# Patient Record
Sex: FEMALE | Race: WHITE | NOT HISPANIC OR LATINO | Employment: OTHER | URBAN - METROPOLITAN AREA
[De-identification: names, ages, dates, MRNs, and addresses within clinical notes are randomized per-mention and may not be internally consistent; named-entity substitution may affect disease eponyms.]

---

## 2018-05-31 ENCOUNTER — OFFICE VISIT (OUTPATIENT)
Dept: INTERNAL MEDICINE | Facility: CLINIC | Age: 73
End: 2018-05-31

## 2018-05-31 VITALS
BODY MASS INDEX: 38.09 KG/M2 | WEIGHT: 207 LBS | HEIGHT: 62 IN | DIASTOLIC BLOOD PRESSURE: 82 MMHG | SYSTOLIC BLOOD PRESSURE: 124 MMHG

## 2018-05-31 DIAGNOSIS — G25.81 RLS (RESTLESS LEGS SYNDROME): Chronic | ICD-10-CM

## 2018-05-31 DIAGNOSIS — E03.9 ACQUIRED HYPOTHYROIDISM: Chronic | ICD-10-CM

## 2018-05-31 DIAGNOSIS — E78.01 FAMILIAL HYPERCHOLESTEROLEMIA: Chronic | ICD-10-CM

## 2018-05-31 DIAGNOSIS — F32.5 MAJOR DEPRESSIVE DISORDER WITH SINGLE EPISODE, IN FULL REMISSION (HCC): Primary | Chronic | ICD-10-CM

## 2018-05-31 DIAGNOSIS — I10 ESSENTIAL HYPERTENSION: Chronic | ICD-10-CM

## 2018-05-31 DIAGNOSIS — R11.0 NAUSEA: Chronic | ICD-10-CM

## 2018-05-31 PROCEDURE — 99204 OFFICE O/P NEW MOD 45 MIN: CPT | Performed by: INTERNAL MEDICINE

## 2018-05-31 RX ORDER — FERROUS SULFATE 325(65) MG
325 TABLET ORAL DAILY
COMMUNITY
End: 2020-04-29 | Stop reason: SDUPTHER

## 2018-05-31 RX ORDER — PRAMIPEXOLE DIHYDROCHLORIDE 0.12 MG/1
0.12 TABLET ORAL DAILY
COMMUNITY
Start: 2018-05-08 | End: 2018-07-26 | Stop reason: DRUGHIGH

## 2018-05-31 RX ORDER — TELMISARTAN 80 MG/1
80 TABLET ORAL DAILY
COMMUNITY
Start: 2018-04-04 | End: 2018-08-23 | Stop reason: SDUPTHER

## 2018-05-31 RX ORDER — LEVOTHYROXINE SODIUM 0.15 MG/1
150 TABLET ORAL DAILY
COMMUNITY
Start: 2018-05-07 | End: 2018-08-21 | Stop reason: SDUPTHER

## 2018-05-31 RX ORDER — PRAMIPEXOLE DIHYDROCHLORIDE 0.25 MG/1
TABLET ORAL
COMMUNITY
Start: 2018-05-08 | End: 2018-05-31 | Stop reason: DRUGHIGH

## 2018-05-31 RX ORDER — ONDANSETRON 4 MG/1
4 TABLET, FILM COATED ORAL EVERY 8 HOURS PRN
COMMUNITY
End: 2020-01-09 | Stop reason: SDUPTHER

## 2018-05-31 RX ORDER — PRAMIPEXOLE DIHYDROCHLORIDE 0.25 MG/1
0.25 TABLET ORAL DAILY
COMMUNITY
End: 2018-07-26 | Stop reason: DRUGHIGH

## 2018-05-31 RX ORDER — ESCITALOPRAM OXALATE 20 MG/1
20 TABLET ORAL DAILY
COMMUNITY
Start: 2018-05-07 | End: 2018-09-07 | Stop reason: SDUPTHER

## 2018-05-31 RX ORDER — GABAPENTIN 800 MG/1
800 TABLET ORAL DAILY
COMMUNITY
Start: 2018-05-14 | End: 2018-08-23 | Stop reason: SDUPTHER

## 2018-05-31 RX ORDER — TRAMADOL HYDROCHLORIDE 50 MG/1
50 TABLET ORAL DAILY
COMMUNITY
Start: 2018-05-14 | End: 2018-08-23 | Stop reason: SDUPTHER

## 2018-05-31 RX ORDER — ROSUVASTATIN CALCIUM 5 MG/1
5 TABLET, COATED ORAL DAILY
COMMUNITY
Start: 2018-04-03 | End: 2018-08-23 | Stop reason: SDUPTHER

## 2018-05-31 NOTE — PROGRESS NOTES
Subjective   Prudence Zollinger is a 72 y.o. female.     History of Present Illness     The following portions of the patient's history were reviewed and updated as appropriate: allergies, current medications, past family history, past medical history, past social history, past surgical history and problem list.  73 yo/f with hypothyroidism, depression, RLS, Hyperlipidemia, HTN, chronic abdominal pain, nausea and vomiting, mixed constipation and diarrhea here for an initial evaluation. She has not had a GI work up thus far at her previous practice.   Review of Systems   Constitutional: Positive for fatigue.   HENT: Negative.    Eyes: Negative.    Respiratory: Negative.    Cardiovascular: Negative.    Gastrointestinal: Positive for abdominal distention, abdominal pain, constipation, diarrhea and nausea.   Endocrine: Negative.    Genitourinary: Negative.    Musculoskeletal: Positive for arthralgias and back pain.   Skin: Negative.    Allergic/Immunologic: Negative.    Neurological: Negative.    Hematological: Negative.    Psychiatric/Behavioral: Negative.        Objective   Physical Exam   Constitutional: She is oriented to person, place, and time. She appears well-developed and well-nourished.   HENT:   Head: Normocephalic and atraumatic.   Eyes: EOM are normal. Pupils are equal, round, and reactive to light.   Neck: Normal range of motion.   Cardiovascular: Normal rate, regular rhythm and normal heart sounds.    Pulmonary/Chest: Effort normal and breath sounds normal.   Abdominal: Soft. She exhibits distension. There is tenderness.   Musculoskeletal:   Low back pain   Neurological: She is alert and oriented to person, place, and time.   Skin: Skin is warm and dry.   Psychiatric: She has a normal mood and affect. Her behavior is normal. Judgment and thought content normal.   Nursing note and vitals reviewed.        Assessment/Plan   Prudence was seen today for hypertension and hypothyroidism.    Diagnoses and all  orders for this visit:    Major depressive disorder with single episode, in full remission  Comments:  doing relatively    Acquired hypothyroidism  Comments:  will check TFT's  Orders:  -     TSH; Future    RLS (restless legs syndrome)  Comments:  taking Mirapex    Familial hypercholesterolemia  Comments:  will check lipids  Orders:  -     Lipid Panel; Future    Essential hypertension  Comments:  well controlled  Orders:  -     Comprehensive Metabolic Panel; Future  -     CBC & Differential; Future    Nausea  Comments:  UGI and GI follow up  Orders:  -     FL Upper GI With Air Contrast; Future  -     Ambulatory Referral to Gastroenterology

## 2018-06-01 ENCOUNTER — LAB (OUTPATIENT)
Dept: INTERNAL MEDICINE | Facility: CLINIC | Age: 73
End: 2018-06-01

## 2018-06-01 DIAGNOSIS — I10 ESSENTIAL HYPERTENSION: ICD-10-CM

## 2018-06-01 DIAGNOSIS — E03.9 ACQUIRED HYPOTHYROIDISM: Chronic | ICD-10-CM

## 2018-06-01 DIAGNOSIS — E78.01 FAMILIAL HYPERCHOLESTEROLEMIA: Chronic | ICD-10-CM

## 2018-06-01 LAB
ALBUMIN SERPL-MCNC: 4.1 G/DL (ref 3.5–5.2)
ALBUMIN/GLOB SERPL: 1.4 G/DL
ALP SERPL-CCNC: 120 U/L (ref 39–117)
ALT SERPL-CCNC: 18 U/L (ref 1–33)
AST SERPL-CCNC: 18 U/L (ref 1–32)
BASOPHILS # BLD AUTO: 0.01 10*3/MM3 (ref 0–0.2)
BASOPHILS NFR BLD AUTO: 0.1 % (ref 0–2)
BILIRUB SERPL-MCNC: 0.3 MG/DL (ref 0.1–1.2)
BUN SERPL-MCNC: 22 MG/DL (ref 8–23)
BUN/CREAT SERPL: 21 (ref 7–25)
CALCIUM SERPL-MCNC: 9.6 MG/DL (ref 8.6–10.5)
CHLORIDE SERPL-SCNC: 101 MMOL/L (ref 98–107)
CHOLEST SERPL-MCNC: 118 MG/DL (ref 0–200)
CO2 SERPL-SCNC: 23.4 MMOL/L (ref 22–29)
CREAT SERPL-MCNC: 1.05 MG/DL (ref 0.57–1)
DEPRECATED RDW RBC AUTO: 44.9 FL (ref 37–54)
EOSINOPHIL # BLD AUTO: 0.15 10*3/MM3 (ref 0–0.7)
EOSINOPHIL NFR BLD AUTO: 1.6 % (ref 0–5)
ERYTHROCYTE [DISTWIDTH] IN BLOOD BY AUTOMATED COUNT: 13.7 % (ref 11.5–15)
GLOBULIN SER CALC-MCNC: 3 GM/DL
GLUCOSE SERPL-MCNC: 85 MG/DL (ref 65–99)
HCT VFR BLD AUTO: 36.6 % (ref 34.1–44.9)
HDLC SERPL-MCNC: 46 MG/DL (ref 40–60)
HGB BLD-MCNC: 11.7 G/DL (ref 11.2–15.7)
LDLC SERPL CALC-MCNC: 36 MG/DL (ref 0–100)
LYMPHOCYTES # BLD AUTO: 2.05 10*3/MM3 (ref 0.8–7)
LYMPHOCYTES NFR BLD AUTO: 22.4 % (ref 10–60)
MCH RBC QN AUTO: 29.2 PG (ref 26–34)
MCHC RBC AUTO-ENTMCNC: 32 G/DL (ref 31–37)
MCV RBC AUTO: 91.3 FL (ref 80–100)
MONOCYTES # BLD AUTO: 0.59 10*3/MM3 (ref 0–1)
MONOCYTES NFR BLD AUTO: 6.4 % (ref 0–13)
NEUTROPHILS # BLD AUTO: 6.35 10*3/MM3 (ref 1–11)
NEUTROPHILS NFR BLD AUTO: 69.5 % (ref 30–85)
PLATELET # BLD AUTO: 255 10*3/MM3 (ref 150–450)
PMV BLD AUTO: 10.2 FL (ref 6–12)
POTASSIUM SERPL-SCNC: 4.5 MMOL/L (ref 3.5–5.2)
PROT SERPL-MCNC: 7.1 G/DL (ref 6–8.5)
RBC # BLD AUTO: 4.01 10*6/MM3 (ref 3.93–5.22)
SODIUM SERPL-SCNC: 141 MMOL/L (ref 136–145)
TRIGL SERPL-MCNC: 182 MG/DL (ref 0–150)
TSH SERPL-ACNC: 0.16 MIU/ML (ref 0.27–4.2)
VLDLC SERPL-MCNC: 36.4 MG/DL (ref 5–40)
WBC NRBC COR # BLD: 9.15 10*3/MM3 (ref 5–10)

## 2018-06-01 PROCEDURE — 85025 COMPLETE CBC W/AUTO DIFF WBC: CPT | Performed by: INTERNAL MEDICINE

## 2018-06-01 PROCEDURE — 36415 COLL VENOUS BLD VENIPUNCTURE: CPT | Performed by: INTERNAL MEDICINE

## 2018-06-27 ENCOUNTER — HOSPITAL ENCOUNTER (OUTPATIENT)
Dept: GENERAL RADIOLOGY | Facility: HOSPITAL | Age: 73
Discharge: HOME | End: 2018-06-27
Admitting: INTERNAL MEDICINE

## 2018-06-27 DIAGNOSIS — R11.0 NAUSEA: Chronic | ICD-10-CM

## 2018-06-27 PROCEDURE — A9270 NON-COVERED ITEM OR SERVICE: HCPCS | Performed by: RADIOLOGY

## 2018-06-27 PROCEDURE — 63710000001 BARIUM SULFATE 96 % RECONSTITUTED SUSPENSION: Performed by: RADIOLOGY

## 2018-06-27 PROCEDURE — 74247: CPT

## 2018-06-27 PROCEDURE — 63710000001 SOD BICARB-CITRIC ACID-SIMETHICONE 2.21-1.53-0.04 G PACK: Performed by: RADIOLOGY

## 2018-06-27 PROCEDURE — 63710000001 BARIUM SULFATE 98 % RECONSTITUTED SUSPENSION: Performed by: RADIOLOGY

## 2018-06-27 RX ADMIN — ANTACID/ANTIFLATULENT 1 TABLET: 380; 550; 10; 10 GRANULE, EFFERVESCENT ORAL at 08:24

## 2018-06-27 RX ADMIN — BARIUM SULFATE 135 ML: 980 POWDER, FOR SUSPENSION ORAL at 08:23

## 2018-06-27 RX ADMIN — BARIUM SULFATE 183 ML: 960 POWDER, FOR SUSPENSION ORAL at 08:23

## 2018-06-28 ENCOUNTER — OFFICE VISIT (OUTPATIENT)
Dept: INTERNAL MEDICINE | Facility: CLINIC | Age: 73
End: 2018-06-28

## 2018-06-28 VITALS
SYSTOLIC BLOOD PRESSURE: 128 MMHG | DIASTOLIC BLOOD PRESSURE: 82 MMHG | HEIGHT: 62 IN | BODY MASS INDEX: 37.91 KG/M2 | WEIGHT: 206 LBS

## 2018-06-28 DIAGNOSIS — F32.5 MAJOR DEPRESSIVE DISORDER WITH SINGLE EPISODE, IN FULL REMISSION (HCC): Chronic | ICD-10-CM

## 2018-06-28 DIAGNOSIS — E78.01 FAMILIAL HYPERCHOLESTEROLEMIA: Chronic | ICD-10-CM

## 2018-06-28 DIAGNOSIS — E03.9 ACQUIRED HYPOTHYROIDISM: Chronic | ICD-10-CM

## 2018-06-28 DIAGNOSIS — G25.81 RLS (RESTLESS LEGS SYNDROME): Chronic | ICD-10-CM

## 2018-06-28 DIAGNOSIS — I10 ESSENTIAL HYPERTENSION: Primary | Chronic | ICD-10-CM

## 2018-06-28 PROCEDURE — 99214 OFFICE O/P EST MOD 30 MIN: CPT | Performed by: INTERNAL MEDICINE

## 2018-06-28 NOTE — PROGRESS NOTES
Subjective   Prudence Zollinger is a 72 y.o. female.     History of Present Illness     The following portions of the patient's history were reviewed and updated as appropriate: allergies, current medications, past family history, past medical history, past social history, past surgical history and problem list.  71 yo/f with a chief complaint of HTN and hyperlipidemia (both well controlled), hypothyroidism (well controlled), RLS (well controlled at present), major depressive disorder (very well controlled), here for follow up.  Review of Systems   Constitutional: Negative.    HENT: Negative.    Eyes: Negative.    Respiratory: Negative.    Cardiovascular: Negative.    Gastrointestinal: Negative.    Endocrine: Negative.    Genitourinary: Negative.    Musculoskeletal: Positive for arthralgias.   Skin: Negative.    Allergic/Immunologic: Negative.    Neurological: Negative.    Hematological: Negative.    Psychiatric/Behavioral: Negative.        Objective   Physical Exam   Constitutional: She is oriented to person, place, and time. She appears well-developed and well-nourished.   HENT:   Head: Normocephalic and atraumatic.   Eyes: EOM are normal. Pupils are equal, round, and reactive to light.   Neck: Normal range of motion. Neck supple.   Cardiovascular: Normal rate, regular rhythm and normal heart sounds.    Pulmonary/Chest: Effort normal and breath sounds normal.   Abdominal: Soft. Bowel sounds are normal.   Musculoskeletal: Normal range of motion.   Neurological: She is alert and oriented to person, place, and time.   Skin: Skin is warm and dry.   Psychiatric: She has a normal mood and affect. Her behavior is normal. Judgment and thought content normal.   Nursing note and vitals reviewed.        Assessment/Plan   Prudence was seen today for follow-up.    Diagnoses and all orders for this visit:    Essential hypertension  Comments:  well controlled    Familial hypercholesterolemia  Comments:  well controlled no  changes needed    Acquired hypothyroidism  Comments:  routinely check TSH    RLS (restless legs syndrome)  Comments:  well controlled with her current medications    Major depressive disorder with single episode, in full remission  Comments:  well controlled overall

## 2018-07-09 ENCOUNTER — CONVERSION ENCOUNTER (OUTPATIENT)
Dept: GENERAL RADIOLOGY | Facility: HOSPITAL | Age: 73
End: 2018-07-09

## 2018-07-26 ENCOUNTER — OFFICE VISIT (OUTPATIENT)
Dept: INTERNAL MEDICINE | Facility: CLINIC | Age: 73
End: 2018-07-26

## 2018-07-26 VITALS
OXYGEN SATURATION: 98 % | WEIGHT: 206 LBS | HEART RATE: 66 BPM | TEMPERATURE: 98.4 F | SYSTOLIC BLOOD PRESSURE: 152 MMHG | DIASTOLIC BLOOD PRESSURE: 92 MMHG | BODY MASS INDEX: 37.98 KG/M2

## 2018-07-26 DIAGNOSIS — I10 ESSENTIAL HYPERTENSION: ICD-10-CM

## 2018-07-26 DIAGNOSIS — K06.8 BLEEDING GUMS: Primary | ICD-10-CM

## 2018-07-26 DIAGNOSIS — E03.9 ACQUIRED HYPOTHYROIDISM: ICD-10-CM

## 2018-07-26 LAB — TSH SERPL-ACNC: 0.41 MIU/ML (ref 0.27–4.2)

## 2018-07-26 PROCEDURE — 99213 OFFICE O/P EST LOW 20 MIN: CPT | Performed by: NURSE PRACTITIONER

## 2018-07-26 RX ORDER — PRAMIPEXOLE DIHYDROCHLORIDE 0.12 MG/1
0.12 TABLET ORAL DAILY
COMMUNITY
End: 2018-10-03 | Stop reason: SDUPTHER

## 2018-07-26 RX ORDER — PRAMIPEXOLE DIHYDROCHLORIDE 0.5 MG/1
1 TABLET ORAL DAILY
COMMUNITY
Start: 2018-07-02 | End: 2018-10-04 | Stop reason: SDUPTHER

## 2018-07-26 RX ORDER — OMEPRAZOLE 40 MG/1
1 CAPSULE, DELAYED RELEASE ORAL DAILY
COMMUNITY
Start: 2018-06-05 | End: 2020-04-29 | Stop reason: SDUPTHER

## 2018-07-26 NOTE — PROGRESS NOTES
Lorenzo Holloway Zollinger is a 72 y.o. female.     She was at [facility] for routine appointment yesterday and her blood pressure was elevated. She was informed that her gums were inflamed.  No treated was initiated. She reports no increased salt or caffiene intake.        Dental Pain    This is a new problem. The pain is at a severity of 0/10. The patient is experiencing no pain. Associated symptoms include facial pain (intermittent, chronic ) and oral bleeding. Pertinent negatives include no difficulty swallowing, fever, sinus pressure or thermal sensitivity. She has tried nothing for the symptoms.   Hypertension   This is a chronic problem. The current episode started more than 1 year ago. The problem is uncontrolled. Associated symptoms include palpitations (? yesterday in morning, intermittent ). Pertinent negatives include no anxiety, blurred vision, chest pain, headaches, orthopnea, peripheral edema, PND or shortness of breath. Current antihypertension treatment includes angiotensin blockers and calcium channel blockers. The current treatment provides moderate improvement.        The following portions of the patient's history were reviewed and updated as appropriate: allergies, current medications, past family history, past medical history, past social history, past surgical history and problem list.    Review of Systems   Constitutional: Negative for fever.   HENT: Negative for postnasal drip and sinus pressure.    Eyes: Negative for blurred vision.   Respiratory: Negative for cough and shortness of breath.    Cardiovascular: Positive for palpitations (? yesterday in morning, intermittent ) and leg swelling. Negative for chest pain, orthopnea and PND.   Neurological: Negative for dizziness and headaches.       Objective   Physical Exam   Constitutional: She is oriented to person, place, and time. She appears well-developed and well-nourished.   HENT:   Head: Normocephalic.   Nose: Nose normal.    Cardiovascular: Regular rhythm and normal heart sounds.  Exam reveals no S3 and no S4.    No murmur heard.  Repeat bp left arm 162/90  No pedal edema    Pulmonary/Chest: Effort normal and breath sounds normal. She has no decreased breath sounds. She has no wheezes. She has no rhonchi. She has no rales.   Musculoskeletal: She exhibits no edema.   Neurological: She is alert and oriented to person, place, and time. Gait normal.   Skin: Skin is warm and dry.   Psychiatric: She has a normal mood and affect.       Assessment/Plan   Prudence was seen today for dental pain and hypertension.    Diagnoses and all orders for this visit:    Bleeding gums  Comments:  continue with peridonitis     Essential hypertension  Comments:  goal of less than 140/90; will increase verapamil 1.5 tablet     Acquired hypothyroidism  Comments:  will check labs most recent TSH low   Orders:  -     TSH Rfx On Abnormal To Free T4; Future  -     TSH Rfx On Abnormal To Free T4    She will call in one week with bp readings.

## 2018-07-26 NOTE — PATIENT INSTRUCTIONS
"DASH Eating Plan  DASH stands for \"Dietary Approaches to Stop Hypertension.\" The DASH eating plan is a healthy eating plan that has been shown to reduce high blood pressure (hypertension). It may also reduce your risk for type 2 diabetes, heart disease, and stroke. The DASH eating plan may also help with weight loss.  What are tips for following this plan?  General guidelines  · Avoid eating more than 2,300 mg (milligrams) of salt (sodium) a day. If you have hypertension, you may need to reduce your sodium intake to 1,500 mg a day.  · Limit alcohol intake to no more than 1 drink a day for nonpregnant women and 2 drinks a day for men. One drink equals 12 oz of beer, 5 oz of wine, or 1½ oz of hard liquor.  · Work with your health care provider to maintain a healthy body weight or to lose weight. Ask what an ideal weight is for you.  · Get at least 30 minutes of exercise that causes your heart to beat faster (aerobic exercise) most days of the week. Activities may include walking, swimming, or biking.  · Work with your health care provider or diet and nutrition specialist (dietitian) to adjust your eating plan to your individual calorie needs.  Reading food labels  · Check food labels for the amount of sodium per serving. Choose foods with less than 5 percent of the Daily Value of sodium. Generally, foods with less than 300 mg of sodium per serving fit into this eating plan.  · To find whole grains, look for the word \"whole\" as the first word in the ingredient list.  Shopping  · Buy products labeled as \"low-sodium\" or \"no salt added.\"  · Buy fresh foods. Avoid canned foods and premade or frozen meals.  Cooking  · Avoid adding salt when cooking. Use salt-free seasonings or herbs instead of table salt or sea salt. Check with your health care provider or pharmacist before using salt substitutes.  · Do not echols foods. Cook foods using healthy methods such as baking, boiling, grilling, and broiling instead.  · Cook with " heart-healthy oils, such as olive, canola, soybean, or sunflower oil.  Meal planning    · Eat a balanced diet that includes:  ? 5 or more servings of fruits and vegetables each day. At each meal, try to fill half of your plate with fruits and vegetables.  ? Up to 6-8 servings of whole grains each day.  ? Less than 6 oz of lean meat, poultry, or fish each day. A 3-oz serving of meat is about the same size as a deck of cards. One egg equals 1 oz.  ? 2 servings of low-fat dairy each day.  ? A serving of nuts, seeds, or beans 5 times each week.  ? Heart-healthy fats. Healthy fats called Omega-3 fatty acids are found in foods such as flaxseeds and coldwater fish, like sardines, salmon, and mackerel.  · Limit how much you eat of the following:  ? Canned or prepackaged foods.  ? Food that is high in trans fat, such as fried foods.  ? Food that is high in saturated fat, such as fatty meat.  ? Sweets, desserts, sugary drinks, and other foods with added sugar.  ? Full-fat dairy products.  · Do not salt foods before eating.  · Try to eat at least 2 vegetarian meals each week.  · Eat more home-cooked food and less restaurant, buffet, and fast food.  · When eating at a restaurant, ask that your food be prepared with less salt or no salt, if possible.  What foods are recommended?  The items listed may not be a complete list. Talk with your dietitian about what dietary choices are best for you.  Grains  Whole-grain or whole-wheat bread. Whole-grain or whole-wheat pasta. Brown rice. Oatmeal. Quinoa. Bulgur. Whole-grain and low-sodium cereals. Shraddha bread. Low-fat, low-sodium crackers. Whole-wheat flour tortillas.  Vegetables  Fresh or frozen vegetables (raw, steamed, roasted, or grilled). Low-sodium or reduced-sodium tomato and vegetable juice. Low-sodium or reduced-sodium tomato sauce and tomato paste. Low-sodium or reduced-sodium canned vegetables.  Fruits  All fresh, dried, or frozen fruit. Canned fruit in natural juice (without  added sugar).  Meat and other protein foods  Skinless chicken or turkey. Ground chicken or turkey. Pork with fat trimmed off. Fish and seafood. Egg whites. Dried beans, peas, or lentils. Unsalted nuts, nut butters, and seeds. Unsalted canned beans. Lean cuts of beef with fat trimmed off. Low-sodium, lean deli meat.  Dairy  Low-fat (1%) or fat-free (skim) milk. Fat-free, low-fat, or reduced-fat cheeses. Nonfat, low-sodium ricotta or cottage cheese. Low-fat or nonfat yogurt. Low-fat, low-sodium cheese.  Fats and oils  Soft margarine without trans fats. Vegetable oil. Low-fat, reduced-fat, or light mayonnaise and salad dressings (reduced-sodium). Canola, safflower, olive, soybean, and sunflower oils. Avocado.  Seasoning and other foods  Herbs. Spices. Seasoning mixes without salt. Unsalted popcorn and pretzels. Fat-free sweets.  What foods are not recommended?  The items listed may not be a complete list. Talk with your dietitian about what dietary choices are best for you.  Grains  Baked goods made with fat, such as croissants, muffins, or some breads. Dry pasta or rice meal packs.  Vegetables  Creamed or fried vegetables. Vegetables in a cheese sauce. Regular canned vegetables (not low-sodium or reduced-sodium). Regular canned tomato sauce and paste (not low-sodium or reduced-sodium). Regular tomato and vegetable juice (not low-sodium or reduced-sodium). Pickles. Olives.  Fruits  Canned fruit in a light or heavy syrup. Fried fruit. Fruit in cream or butter sauce.  Meat and other protein foods  Fatty cuts of meat. Ribs. Fried meat. Nicolas. Sausage. Bologna and other processed lunch meats. Salami. Fatback. Hotdogs. Bratwurst. Salted nuts and seeds. Canned beans with added salt. Canned or smoked fish. Whole eggs or egg yolks. Chicken or turkey with skin.  Dairy  Whole or 2% milk, cream, and half-and-half. Whole or full-fat cream cheese. Whole-fat or sweetened yogurt. Full-fat cheese. Nondairy creamers. Whipped toppings.  Processed cheese and cheese spreads.  Fats and oils  Butter. Stick margarine. Lard. Shortening. Ghee. Nicolas fat. Tropical oils, such as coconut, palm kernel, or palm oil.  Seasoning and other foods  Salted popcorn and pretzels. Onion salt, garlic salt, seasoned salt, table salt, and sea salt. Worcestershire sauce. Tartar sauce. Barbecue sauce. Teriyaki sauce. Soy sauce, including reduced-sodium. Steak sauce. Canned and packaged gravies. Fish sauce. Oyster sauce. Cocktail sauce. Horseradish that you find on the shelf. Ketchup. Mustard. Meat flavorings and tenderizers. Bouillon cubes. Hot sauce and Tabasco sauce. Premade or packaged marinades. Premade or packaged taco seasonings. Relishes. Regular salad dressings.  Where to find more information:  · National Heart, Lung, and Blood [place]: www.nhlbi.nih.gov  · American Heart Association: www.heart.org  Summary  · The DASH eating plan is a healthy eating plan that has been shown to reduce high blood pressure (hypertension). It may also reduce your risk for type 2 diabetes, heart disease, and stroke.  · With the DASH eating plan, you should limit salt (sodium) intake to 2,300 mg a day. If you have hypertension, you may need to reduce your sodium intake to 1,500 mg a day.  · When on the DASH eating plan, aim to eat more fresh fruits and vegetables, whole grains, lean proteins, low-fat dairy, and heart-healthy fats.  · Work with your health care provider or diet and nutrition specialist (dietitian) to adjust your eating plan to your individual calorie needs.  This information is not intended to replace advice given to you by your health care provider. Make sure you discuss any questions you have with your health care provider.  Document Released: 12/06/2012 Document Revised: 12/11/2017 Document Reviewed: 12/11/2017  [employer] Interactive Patient Education © 2018 [employer]

## 2018-07-30 NOTE — TELEPHONE ENCOUNTER
----- Message from Cami Howe sent at 7/30/2018 10:52 AM EDT -----  Contact: Patient   Patient called needing refill on     verapamil SR (CALAN-SR) 180 MG CR tablet    She saw Jessica last week who increased her medicine and now is out.  This morning at 10 AM her /85; pulse 65.  Please advise.      Patient:  [phone]    Pharmacy:  [facility] [address] [place] [address] [address] [facility] [phone] [facility] [phone] FX

## 2018-07-30 NOTE — TELEPHONE ENCOUNTER
I attempted to call patient to discuss her blood pressure readings. There was no answer so I left a message for patient to return call.

## 2018-07-31 ENCOUNTER — TELEPHONE (OUTPATIENT)
Dept: INTERNAL MEDICINE | Facility: CLINIC | Age: 73
End: 2018-07-31

## 2018-07-31 DIAGNOSIS — I10 ESSENTIAL HYPERTENSION: Primary | ICD-10-CM

## 2018-07-31 RX ORDER — VERAPAMIL HYDROCHLORIDE 300 MG/1
300 CAPSULE, EXTENDED RELEASE ORAL NIGHTLY
Qty: 30 CAPSULE | Refills: 1 | OUTPATIENT
Start: 2018-07-31 | End: 2018-10-02 | Stop reason: SDUPTHER

## 2018-07-31 NOTE — TELEPHONE ENCOUNTER
I called patient and she states her blood pressure remains in 150/80's and hr 60-70's.. She denies any headache, dizziness or visual disturbances. Will change to verapamil 300 mg. She will continue to monitor and bring to upcoming appointment.

## 2018-08-21 ENCOUNTER — OFFICE VISIT (OUTPATIENT)
Dept: INTERNAL MEDICINE | Facility: CLINIC | Age: 73
End: 2018-08-21

## 2018-08-21 VITALS
WEIGHT: 204 LBS | HEIGHT: 62 IN | SYSTOLIC BLOOD PRESSURE: 136 MMHG | DIASTOLIC BLOOD PRESSURE: 86 MMHG | BODY MASS INDEX: 37.54 KG/M2

## 2018-08-21 DIAGNOSIS — E03.9 ACQUIRED HYPOTHYROIDISM: Chronic | ICD-10-CM

## 2018-08-21 DIAGNOSIS — I10 ESSENTIAL HYPERTENSION: Primary | Chronic | ICD-10-CM

## 2018-08-21 DIAGNOSIS — G25.81 RLS (RESTLESS LEGS SYNDROME): Chronic | ICD-10-CM

## 2018-08-21 DIAGNOSIS — F32.5 MAJOR DEPRESSIVE DISORDER WITH SINGLE EPISODE, IN FULL REMISSION (HCC): ICD-10-CM

## 2018-08-21 PROCEDURE — 99214 OFFICE O/P EST MOD 30 MIN: CPT | Performed by: INTERNAL MEDICINE

## 2018-08-21 RX ORDER — LEVOTHYROXINE SODIUM 0.15 MG/1
150 TABLET ORAL DAILY
Qty: 90 TABLET | Refills: 3 | Status: SHIPPED | OUTPATIENT
Start: 2018-08-21 | End: 2018-09-07 | Stop reason: SDUPTHER

## 2018-08-21 NOTE — PROGRESS NOTES
Subjective   Prudence Zollinger is a 72 y.o. female.     History of Present Illness     The following portions of the patient's history were reviewed and updated as appropriate: allergies, current medications, past family history, past medical history, past social history, past surgical history and problem list.  73 yo/f with a chief complaint of labile HTN (under better control), depression (well controlled), hypothyroidism (well controlled), RLS (well controlled), here for follow up. She is doing better overall.  Review of Systems   Constitutional: Positive for fatigue.   HENT: Negative.    Eyes: Negative.    Respiratory: Negative.    Cardiovascular: Negative.    Gastrointestinal: Negative.    Endocrine: Negative.    Genitourinary: Negative.    Musculoskeletal: Positive for arthralgias.   Skin: Negative.    Allergic/Immunologic: Negative.    Neurological: Negative.    Hematological: Negative.    Psychiatric/Behavioral: Negative.        Objective   Physical Exam   Constitutional: She is oriented to person, place, and time. She appears well-developed and well-nourished.   HENT:   Head: Normocephalic and atraumatic.   Eyes: Pupils are equal, round, and reactive to light. EOM are normal.   Neck: Normal range of motion. Neck supple.   Cardiovascular: Normal rate, regular rhythm and normal heart sounds.    Pulmonary/Chest: Effort normal and breath sounds normal.   Abdominal: Soft. Bowel sounds are normal.   Musculoskeletal: Normal range of motion.   Neurological: She is alert and oriented to person, place, and time.   Skin: Skin is warm and dry.   Psychiatric: She has a normal mood and affect. Her behavior is normal. Judgment and thought content normal.   Nursing note and vitals reviewed.        Assessment/Plan   Prudence was seen today for hypertension, hyperlipidemia and hypothyroidism.    Diagnoses and all orders for this visit:    Essential hypertension  Comments:  doing better overall    Acquired  hypothyroidism  Comments:  well controlled    RLS (restless legs syndrome)  Comments:  doing better overall    Major depressive disorder with single episode, in full remission (CMS/[facility])  Comments:  doing well on lexapro

## 2018-08-24 RX ORDER — GABAPENTIN 800 MG/1
800 TABLET ORAL DAILY
Qty: 90 TABLET | Refills: 3 | Status: SHIPPED | OUTPATIENT
Start: 2018-08-24 | End: 2019-03-15 | Stop reason: SDUPTHER

## 2018-08-24 RX ORDER — ROSUVASTATIN CALCIUM 5 MG/1
5 TABLET, COATED ORAL DAILY
Qty: 90 TABLET | Refills: 3 | Status: SHIPPED | OUTPATIENT
Start: 2018-08-24 | End: 2019-11-27 | Stop reason: SDUPTHER

## 2018-08-24 RX ORDER — TELMISARTAN 80 MG/1
80 TABLET ORAL DAILY
Qty: 90 TABLET | Refills: 3 | Status: SHIPPED | OUTPATIENT
Start: 2018-08-24 | End: 2019-11-04 | Stop reason: SDUPTHER

## 2018-08-24 RX ORDER — TRAMADOL HYDROCHLORIDE 50 MG/1
50 TABLET ORAL DAILY
Qty: 60 TABLET | Refills: 3 | Status: SHIPPED | OUTPATIENT
Start: 2018-08-24 | End: 2019-03-15 | Stop reason: SDUPTHER

## 2018-09-07 RX ORDER — LEVOTHYROXINE SODIUM 0.15 MG/1
150 TABLET ORAL DAILY
Qty: 30 TABLET | Refills: 5 | Status: SHIPPED | OUTPATIENT
Start: 2018-09-07 | End: 2019-10-24 | Stop reason: SDUPTHER

## 2018-09-07 RX ORDER — ESCITALOPRAM OXALATE 20 MG/1
20 TABLET ORAL DAILY
Qty: 30 TABLET | Refills: 5 | Status: SHIPPED | OUTPATIENT
Start: 2018-09-07 | End: 2019-06-09 | Stop reason: SDUPTHER

## 2018-10-02 DIAGNOSIS — I10 ESSENTIAL HYPERTENSION: ICD-10-CM

## 2018-10-02 RX ORDER — VERAPAMIL HYDROCHLORIDE 300 MG/1
CAPSULE, EXTENDED RELEASE ORAL
Qty: 30 CAPSULE | Refills: 5 | Status: SHIPPED | OUTPATIENT
Start: 2018-10-02 | End: 2019-04-01 | Stop reason: SDUPTHER

## 2018-10-03 RX ORDER — PRAMIPEXOLE DIHYDROCHLORIDE 0.12 MG/1
0.12 TABLET ORAL DAILY
Qty: 90 TABLET | Refills: 3 | Status: SHIPPED | OUTPATIENT
Start: 2018-10-03 | End: 2019-09-25 | Stop reason: SDUPTHER

## 2018-10-04 ENCOUNTER — TELEPHONE (OUTPATIENT)
Dept: INTERNAL MEDICINE | Facility: CLINIC | Age: 73
End: 2018-10-04

## 2018-10-04 ENCOUNTER — CLINICAL SUPPORT (OUTPATIENT)
Dept: INTERNAL MEDICINE | Facility: CLINIC | Age: 73
End: 2018-10-04

## 2018-10-04 DIAGNOSIS — Z23 NEED FOR VACCINATION: Primary | ICD-10-CM

## 2018-10-04 PROCEDURE — G0008 ADMIN INFLUENZA VIRUS VAC: HCPCS | Performed by: INTERNAL MEDICINE

## 2018-10-04 PROCEDURE — 90662 IIV NO PRSV INCREASED AG IM: CPT | Performed by: INTERNAL MEDICINE

## 2018-10-04 RX ORDER — PRAMIPEXOLE DIHYDROCHLORIDE 0.5 MG/1
0.5 TABLET ORAL DAILY
Qty: 90 TABLET | Refills: 3 | Status: SHIPPED | OUTPATIENT
Start: 2018-10-04 | End: 2019-12-27

## 2018-10-04 NOTE — TELEPHONE ENCOUNTER
----- Message from Funmi Allred sent at 10/4/2018 11:35 AM EDT -----  Contact: patient was in  Ms. Zollinger needs a refill on the following:      Mirapex 0.125 and 0.5    [facility] [address] AT [address] - [phone]  - [phone] (Phone)  [phone] (Fax)    Call back at [phone]    Thank you,    Funmi

## 2018-11-06 ENCOUNTER — OFFICE VISIT (OUTPATIENT)
Dept: INTERNAL MEDICINE | Facility: CLINIC | Age: 73
End: 2018-11-06

## 2018-11-06 VITALS
OXYGEN SATURATION: 98 % | HEART RATE: 80 BPM | WEIGHT: 207 LBS | BODY MASS INDEX: 38.09 KG/M2 | TEMPERATURE: 97.7 F | HEIGHT: 62 IN | DIASTOLIC BLOOD PRESSURE: 84 MMHG | SYSTOLIC BLOOD PRESSURE: 146 MMHG

## 2018-11-06 DIAGNOSIS — M25.561 ACUTE PAIN OF RIGHT KNEE: Primary | ICD-10-CM

## 2018-11-06 PROCEDURE — 99213 OFFICE O/P EST LOW 20 MIN: CPT | Performed by: NURSE PRACTITIONER

## 2018-11-06 RX ORDER — FUROSEMIDE 20 MG/1
20 TABLET ORAL DAILY
COMMUNITY
Start: 2018-10-24 | End: 2020-04-29 | Stop reason: SDUPTHER

## 2018-11-06 RX ORDER — METHYLPREDNISOLONE 4 MG/1
TABLET ORAL
Qty: 21 TABLET | Refills: 0 | Status: SHIPPED | OUTPATIENT
Start: 2018-11-06 | End: 2018-11-30

## 2018-11-06 NOTE — PROGRESS NOTES
Lorenzo Holloway Zollinger is a 73 y.o. female.     She joined silver sneakers and has been having knee pain since the end of September. She went to [facility] ER on 10/22/2018 and had doppler (negative for DVT) due to swelling of legs. Xray of knee was performed. She was given lasix which helped significantly.       Knee Pain    The incident occurred more than 1 week ago. The injury mechanism is unknown. The pain is present in the right knee. The quality of the pain is described as aching. The pain is at a severity of 8/10. The pain is moderate. The pain has been constant since onset. Pertinent negatives include no inability to bear weight, loss of motion, numbness or tingling. The symptoms are aggravated by weight bearing and movement. She has tried rest (ultram, voltaren, heat ) for the symptoms. The treatment provided moderate relief.        The following portions of the patient's history were reviewed and updated as appropriate: allergies, current medications, past family history, past medical history, past social history, past surgical history and problem list.    Review of Systems   Constitutional: Negative for activity change, appetite change, fatigue and fever.   Respiratory: Negative for cough, shortness of breath and wheezing.    Cardiovascular: Negative for chest pain, palpitations and leg swelling.   Musculoskeletal: Positive for arthralgias (right knee ), back pain (chronic low back ) and joint swelling (right knee ). Negative for gait problem.   Neurological: Negative for tingling and numbness.       Objective   Physical Exam   Constitutional: She is oriented to person, place, and time. She appears well-developed and well-nourished.   HENT:   Head: Normocephalic.   Nose: Nose normal.   Cardiovascular: Regular rhythm and normal heart sounds.  Exam reveals no S3 and no S4.    No murmur heard.  Pulmonary/Chest: Effort normal and breath sounds normal. She has no decreased breath sounds. She has  no wheezes. She has no rhonchi. She has no rales.   Musculoskeletal: She exhibits no edema.        Right knee: She exhibits decreased range of motion. She exhibits no swelling. Tenderness found. Medial joint line tenderness noted.        Left knee: She exhibits normal range of motion and no swelling. No tenderness found. No medial joint line tenderness noted.   Neurological: She is alert and oriented to person, place, and time. Gait normal.   Reflex Scores:       Patellar reflexes are 2+ on the right side and 2+ on the left side.  Skin: Skin is warm and dry.   Psychiatric: She has a normal mood and affect.       Assessment/Plan   Prudence was seen today for leg pain.    Diagnoses and all orders for this visit:    Acute pain of right knee  -     MethylPREDNISolone (MEDROL) 4 MG tablet; follow package directions  -     Ambulatory Referral to Orthopedic Surgery    Will obtain medical records from [facility]. Due to persistent pain will refer to ortho.

## 2018-11-30 ENCOUNTER — OFFICE VISIT (OUTPATIENT)
Dept: ORTHOPEDIC SURGERY | Facility: CLINIC | Age: 73
End: 2018-11-30

## 2018-11-30 VITALS — BODY MASS INDEX: 38.35 KG/M2 | WEIGHT: 208.4 LBS | HEIGHT: 62 IN | TEMPERATURE: 97.5 F

## 2018-11-30 DIAGNOSIS — M17.11 ARTHRITIS OF RIGHT KNEE: Primary | ICD-10-CM

## 2018-11-30 DIAGNOSIS — M79.604 RIGHT LEG PAIN: ICD-10-CM

## 2018-11-30 PROCEDURE — 99213 OFFICE O/P EST LOW 20 MIN: CPT | Performed by: ORTHOPAEDIC SURGERY

## 2018-11-30 NOTE — PROGRESS NOTES
Patient Name: Prudence Zollinger   YOB: 1945  Referring Primary Care Physician: Manuel Nuñez MD  BMI: Body mass index is 38.43 kg/m².    Chief Complaint:    Chief Complaint   Patient presents with   • Right Leg - Establish Care, Pain    right leg swelling and pain    Subjective:    HPI:   Prudence Zollinger is a pleasant 73 y.o. year old who presents today for evaluation of   Chief Complaint   Patient presents with   • Right Leg - Establish Care, Pain    (Location). Duration: This has been going on for months. Timing: The pain is persistent. and acute. Context or causative factors: unknown.  Relieving Factors:  In the past has tried activtiy modification  ice/heat/rest  prednisone. Leg pain and swelling just below the knee over the anterior tibia.  [place].  Is a PhD     This problem is new to this examiner.     Medications:   Home Medications:  Current Outpatient Medications on File Prior to Visit   Medication Sig   • escitalopram (LEXAPRO) 20 MG tablet Take 1 tablet by mouth Daily.   • ferrous sulfate 325 (65 FE) MG tablet Take 325 mg by mouth Daily.   • furosemide (LASIX) 20 MG tablet Take 20 mg by mouth Daily.   • gabapentin (NEURONTIN) 800 MG tablet Take 1 tablet by mouth Daily.   • levothyroxine (SYNTHROID, LEVOTHROID) 150 MCG tablet Take 1 tablet by mouth Daily.   • omeprazole (priLOSEC) 40 MG capsule Take 1 capsule by mouth Daily.   • ondansetron (ZOFRAN) 4 MG tablet Take 4 mg by mouth Every 8 (Eight) Hours As Needed for Nausea or Vomiting.   • pramipexole (MIRAPEX) 0.125 MG tablet Take 1 tablet by mouth Daily.   • pramipexole (MIRAPEX) 0.5 MG tablet Take 1 tablet by mouth Daily.   • rosuvastatin (CRESTOR) 5 MG tablet Take 1 tablet by mouth Daily.   • telmisartan (MICARDIS) 80 MG tablet Take 1 tablet by mouth Daily.   • traMADol (ULTRAM) 50 MG tablet Take 1 tablet by mouth Daily.   • verapamil (VERELAN) 300 MG capsule sustained-release 24 hr 24 hr capsule TAKE ONE CAPSULE BY  MOUTH ONCE NIGHTLY   • VOLTAREN 1 % gel gel    • [DISCONTINUED] MethylPREDNISolone (MEDROL) 4 MG tablet follow package directions     No current facility-administered medications on file prior to visit.      Current Medications:  Scheduled Meds:  Continuous Infusions:  No current facility-administered medications for this visit.   PRN Meds:.    I have reviewed the patient's medical history in detail and updated the computerized patient record.  Review and summarization of old records includes:    History reviewed. No pertinent past medical history.   No past surgical history on file.     Social History     Occupational History   • Not on file   Tobacco Use   • Smoking status: Never Smoker   • Smokeless tobacco: Never Used   Substance and Sexual Activity   • Alcohol use: Yes     Comment: socially     • Drug use: No   • Sexual activity: Not on file    Social History     Social History Narrative   • Not on file        Family History   Problem Relation Age of Onset   • Cancer Mother         colon cancer   • Heart failure Father    • Diabetes Brother    • Heart disease Brother    • Cancer Maternal Grandmother         colon   • Coronary artery disease Paternal Grandfather        ROS: 14 point review of systems was performed and all other systems were reviewed and are negative except for documented findings in HPI and today's encounter.     Allergies: No Known Allergies  Constitutional:  Denies fever, shaking or chills   Eyes:  Denies change in visual acuity   HENT:  Denies nasal congestion or sore throat   Respiratory:  Denies cough or shortness of breath   Cardiovascular:  Denies chest pain or severe LE edema   GI:  Denies abdominal pain, nausea, vomiting, bloody stools or diarrhea   Musculoskeletal:  Numbness, tingling, pain, or loss of motor function only as noted above in history of present illness.  : Denies painful urination or hematuria  Integument:  Denies rash, lesion or ulceration   Neurologic:  Denies  "headache or focal weakness  Endocrine:  Denies lymphadenopathy  Psych:  Denies confusion or change in mental status   Hem:  Denies active bleeding    Subjective     Objective:    Physical Exam: 73 y.o. female  Wt Readings from Last 3 Encounters:   11/30/18 94.5 kg (208 lb 6.4 oz)   11/06/18 93.9 kg (207 lb)   08/21/18 92.5 kg (204 lb)     Ht Readings from Last 3 Encounters:   11/30/18 156.8 cm (61.75\")   11/06/18 156.8 cm (61.75\")   08/21/18 156.8 cm (61.75\")     Body mass index is 38.43 kg/m².    Vitals:    11/30/18 0833   Temp: 97.5 °F (36.4 °C)       Vital signs reviewed.   General Appearance:    Alert, cooperative, in no acute distress                  Eyes: conjunctiva clear  ENT: external ears and nose atraumatic  CV: no peripheral edema  Resp: normal respiratory effort  Skin: no rashes or wounds; normal turgor  Psych: mood and affect appropriate  Lymph: no nodes appreciated  Neuro: gross sensation intact  Vascular:  Palpable peripheral pulse in noted extremity  Musculoskeletal Extremities: KNEE Exam: medial joint line tenderness with crepitation, synovitis, swelling, and joint effusion left knee. swelling ant to the tibial tubercle and just below.  Feels indurated, not fluctuant, no erythema.  ? mass      Radiology:   Imaging done previously in the office, images were personally viewed and discussed at length with the patient:    Indication: pain related symptoms,  Views: 2V AP&LAT right knee(s)   Findings: severe end-stage arthritis (bone on bone, subchondral sclerosis/cysts, osteophytes)  Comparison views: not available      Assessment:     ICD-10-CM ICD-9-CM   1. Arthritis of right knee M17.11 716.96   2. Right leg pain M79.604 729.5        Procedures       Plan: Biomechanics of pertinent body area discussed.  Risks, benefits, alternatives, comparisons, and complications of accepted medicines, injections, recommendations, surgical procedures, and therapies explained and education provided in laymen's " terms. The patient was given the opportunity to ask questions and they were answerved to their satisfaction.   Natural history and expected course of this patient's diagnosis discussed along with evaluation of therapies. Questions answered.  MRI.  Biomechanics and proper use of ambulatory aids:  crutches, walker, cane, &/or trekking poles.  Not typical.  Want to image to see if mass or cyst.  Has arthritis but this exam is atypical.  Fu after    11/30/2018

## 2018-12-13 ENCOUNTER — HOSPITAL ENCOUNTER (OUTPATIENT)
Dept: MRI IMAGING | Facility: HOSPITAL | Age: 73
Discharge: HOME | End: 2018-12-13
Attending: ORTHOPAEDIC SURGERY | Admitting: ORTHOPAEDIC SURGERY

## 2018-12-13 DIAGNOSIS — M17.11 ARTHRITIS OF RIGHT KNEE: ICD-10-CM

## 2018-12-13 DIAGNOSIS — M79.604 RIGHT LEG PAIN: ICD-10-CM

## 2018-12-13 PROCEDURE — 73721 MRI JNT OF LWR EXTRE W/O DYE: CPT

## 2018-12-14 ENCOUNTER — TELEPHONE (OUTPATIENT)
Dept: ORTHOPEDIC SURGERY | Facility: CLINIC | Age: 73
End: 2018-12-14

## 2018-12-14 NOTE — TELEPHONE ENCOUNTER
I called patient to advise of this information and scheduled a follow-up appointment for Thursday 12/20 @ 11:10am    ----- Message from BAY Escoto sent at 12/14/2018 12:00 PM EST -----    Please schedule for f/u appt with SPM to discuss results but let her know there is no evidence of cyst or mass.

## 2018-12-20 ENCOUNTER — OFFICE VISIT (OUTPATIENT)
Dept: ORTHOPEDIC SURGERY | Facility: CLINIC | Age: 73
End: 2018-12-20

## 2018-12-20 VITALS — TEMPERATURE: 98.2 F | WEIGHT: 208 LBS | BODY MASS INDEX: 38.28 KG/M2 | HEIGHT: 62 IN

## 2018-12-20 DIAGNOSIS — M23.203 DEGENERATIVE TEAR OF MEDIAL MENISCUS, RIGHT: ICD-10-CM

## 2018-12-20 DIAGNOSIS — M17.11 ARTHRITIS OF RIGHT KNEE: Primary | ICD-10-CM

## 2018-12-20 DIAGNOSIS — M23.300 MENISCUS, LATERAL, DERANGEMENT, RIGHT: ICD-10-CM

## 2018-12-20 PROCEDURE — 99213 OFFICE O/P EST LOW 20 MIN: CPT | Performed by: ORTHOPAEDIC SURGERY

## 2018-12-20 NOTE — PROGRESS NOTES
"Patient Name: Prudence Zollinger   YOB: 1945  Referring Primary Care Physician: Manuel Nuñez MD  BMI: Body mass index is 38.35 kg/m².    Chief Complaint:    Chief Complaint   Patient presents with   • Right Knee - Follow-up, Pain, Results        Subjective:    HPI:   Prudence Zollinger is a pleasant 73 y.o. year old who presents today for evaluation of   Chief Complaint   Patient presents with   • Right Knee - Follow-up, Pain, Results    (mri fu- doing very well on meds and pt exercises.  Had mri which shows arthritis and deg med and lat men tears.  No mechan syx at present.  \"can tolerate it\"    This problem is not new to this examiner.     Medications:   Home Medications:  Current Outpatient Medications on File Prior to Visit   Medication Sig   • escitalopram (LEXAPRO) 20 MG tablet Take 1 tablet by mouth Daily.   • ferrous sulfate 325 (65 FE) MG tablet Take 325 mg by mouth Daily.   • furosemide (LASIX) 20 MG tablet Take 20 mg by mouth Daily.   • gabapentin (NEURONTIN) 800 MG tablet Take 1 tablet by mouth Daily.   • levothyroxine (SYNTHROID, LEVOTHROID) 150 MCG tablet Take 1 tablet by mouth Daily.   • omeprazole (priLOSEC) 40 MG capsule Take 1 capsule by mouth Daily.   • ondansetron (ZOFRAN) 4 MG tablet Take 4 mg by mouth Every 8 (Eight) Hours As Needed for Nausea or Vomiting.   • pramipexole (MIRAPEX) 0.125 MG tablet Take 1 tablet by mouth Daily.   • pramipexole (MIRAPEX) 0.5 MG tablet Take 1 tablet by mouth Daily.   • rosuvastatin (CRESTOR) 5 MG tablet Take 1 tablet by mouth Daily.   • telmisartan (MICARDIS) 80 MG tablet Take 1 tablet by mouth Daily.   • traMADol (ULTRAM) 50 MG tablet Take 1 tablet by mouth Daily.   • verapamil (VERELAN) 300 MG capsule sustained-release 24 hr 24 hr capsule TAKE ONE CAPSULE BY MOUTH ONCE NIGHTLY   • VOLTAREN 1 % gel gel      No current facility-administered medications on file prior to visit.      Current Medications:  Scheduled Meds:  Continuous " Infusions:  No current facility-administered medications for this visit.   PRN Meds:.    I have reviewed the patient's medical history in detail and updated the computerized patient record.  Review and summarization of old records includes:    History reviewed. No pertinent past medical history.   No past surgical history on file.     Social History     Occupational History   • Not on file   Tobacco Use   • Smoking status: Never Smoker   • Smokeless tobacco: Never Used   Substance and Sexual Activity   • Alcohol use: Yes     Comment: socially     • Drug use: No   • Sexual activity: Not on file      Social History     Social History Narrative   • Not on file        Family History   Problem Relation Age of Onset   • Cancer Mother         colon cancer   • Heart failure Father    • Diabetes Brother    • Heart disease Brother    • Cancer Maternal Grandmother         colon   • Coronary artery disease Paternal Grandfather        ROS: 14 point review of systems was performed and all other systems were reviewed and are negative except for documented findings in HPI and today's encounter.     Allergies: No Known Allergies  Constitutional:  Denies fever, shaking or chills   Eyes:  Denies change in visual acuity   HENT:  Denies nasal congestion or sore throat   Respiratory:  Denies cough or shortness of breath   Cardiovascular:  Denies chest pain or severe LE edema   GI:  Denies abdominal pain, nausea, vomiting, bloody stools or diarrhea   Musculoskeletal:  Numbness, tingling, pain, or loss of motor function only as noted above in history of present illness.  : Denies painful urination or hematuria  Integument:  Denies rash, lesion or ulceration   Neurologic:  Denies headache or focal weakness  Endocrine:  Denies lymphadenopathy  Psych:  Denies confusion or change in mental status   Hem:  Denies active bleeding    Subjective     Objective:    Physical Exam: 73 y.o. female  Wt Readings from Last 3 Encounters:   12/20/18 94.3  "kg (208 lb)   12/13/18 94.3 kg (208 lb)   11/30/18 94.5 kg (208 lb 6.4 oz)     Ht Readings from Last 3 Encounters:   12/20/18 156.8 cm (61.75\")   12/13/18 154.9 cm (60.98\")   11/30/18 156.8 cm (61.75\")     Body mass index is 38.35 kg/m².    Vitals:    12/20/18 1136   Temp: 98.2 °F (36.8 °C)       Vital signs reviewed.   General Appearance:    Alert, cooperative, in no acute distress                  Eyes: conjunctiva clear  ENT: external ears and nose atraumatic  CV: no peripheral edema  Resp: normal respiratory effort  Skin: no rashes or wounds; normal turgor  Psych: mood and affect appropriate  Lymph: no nodes appreciated  Neuro: gross sensation intact  Vascular:  Palpable peripheral pulse in noted extremity  Musculoskeletal Extremities: KNEE Exam: medial joint line tenderness with crepitation, synovitis, swelling, and joint effusion right knee.      Radiology:   Imaging done by outside location, images were personally viewed, report was personally viewed and discussed at length with the patient:    Indication: pain related symptoms,  Views: MRI report read right knee(s)   Findings: deg arthritis and men tears  Comparison views: viewed last xray done in the office.       Assessment:     ICD-10-CM ICD-9-CM   1. Arthritis of right knee M17.11 716.96   2. Meniscus, lateral, derangement, right M23.300 717.40   3. Degenerative tear of medial meniscus, right M23.203 717.3        Procedures       Plan: Biomechanics of pertinent body area discussed.  Risks, benefits, alternatives, comparisons, and complications of accepted medicines, injections, recommendations, surgical procedures, and therapies explained and education provided in laymen's terms. The patient was given the opportunity to ask questions and they were answerved to their satisfaction.   Natural history and expected course of this patient's diagnosis discussed along with evaluation of therapies. Questions answered.      12/20/2018  "

## 2018-12-28 ENCOUNTER — OFFICE VISIT (OUTPATIENT)
Dept: INTERNAL MEDICINE | Facility: CLINIC | Age: 73
End: 2018-12-28

## 2018-12-28 VITALS
DIASTOLIC BLOOD PRESSURE: 78 MMHG | SYSTOLIC BLOOD PRESSURE: 128 MMHG | HEIGHT: 62 IN | BODY MASS INDEX: 39.01 KG/M2 | WEIGHT: 212 LBS

## 2018-12-28 DIAGNOSIS — M54.50 ACUTE BILATERAL LOW BACK PAIN WITHOUT SCIATICA: ICD-10-CM

## 2018-12-28 DIAGNOSIS — E78.01 FAMILIAL HYPERCHOLESTEROLEMIA: Primary | ICD-10-CM

## 2018-12-28 DIAGNOSIS — E03.9 ACQUIRED HYPOTHYROIDISM: ICD-10-CM

## 2018-12-28 DIAGNOSIS — F32.5 MAJOR DEPRESSIVE DISORDER WITH SINGLE EPISODE, IN FULL REMISSION (HCC): ICD-10-CM

## 2018-12-28 DIAGNOSIS — M17.11 ARTHRITIS OF RIGHT KNEE: ICD-10-CM

## 2018-12-28 DIAGNOSIS — I10 ESSENTIAL HYPERTENSION: ICD-10-CM

## 2018-12-28 PROCEDURE — 99214 OFFICE O/P EST MOD 30 MIN: CPT | Performed by: INTERNAL MEDICINE

## 2018-12-28 NOTE — PROGRESS NOTES
Subjective   Prudence Zollinger is a 73 y.o. female. Presents with a chief complaint of acute low back pain from a fall, HTN, Hyperlipidemia, bilateral knee arthropathy, hypothyroidism, depression here for follow up.    History of Present Illness recently fell and injured her back, she is managing with [employer] and ultram.    The following portions of the patient's history were reviewed and updated as appropriate: allergies, current medications, past family history, past medical history, past social history, past surgical history and problem list.    Review of Systems   Constitutional: Positive for fatigue.   HENT: Negative.    Respiratory: Negative.    Cardiovascular: Negative.    Gastrointestinal: Negative.    Endocrine: Negative.    Genitourinary: Negative.    Musculoskeletal: Positive for arthralgias and back pain.   Skin: Negative.    Allergic/Immunologic: Negative.    Neurological: Negative.    Hematological: Negative.    Psychiatric/Behavioral: Negative.        Objective   Physical Exam   Constitutional: She is oriented to person, place, and time. She appears well-developed and well-nourished.   HENT:   Head: Normocephalic and atraumatic.   Eyes: EOM are normal. Pupils are equal, round, and reactive to light.   Neck: Normal range of motion. Neck supple.   Cardiovascular: Normal rate, regular rhythm and normal heart sounds.   Pulmonary/Chest: Effort normal and breath sounds normal.   Abdominal: Soft. Bowel sounds are normal.   Musculoskeletal:   Acute low back pain from a fall   Neurological: She is alert and oriented to person, place, and time.   Skin: Skin is warm and dry.   Psychiatric: She has a normal mood and affect. Her behavior is normal. Judgment and thought content normal.   Nursing note and vitals reviewed.        Assessment/Plan   Prudence was seen today for hypertension.    Diagnoses and all orders for this visit:    Familial hypercholesterolemia  Comments:  check lipids    Essential  hypertension  Comments:  well controlled    Acquired hypothyroidism  Comments:  well controlled    Arthritis of right knee  Comments:  no change in therapy    Major depressive disorder with single episode, in full remission (CMS/HCC)  Comments:  well controlled    Acute bilateral low back pain without sciatica  Comments:  naproxen BID

## 2019-01-10 ENCOUNTER — HOSPITAL ENCOUNTER (OUTPATIENT)
Dept: SLEEP MEDICINE | Facility: HOSPITAL | Age: 74
Discharge: HOME | End: 2019-01-10
Attending: INTERNAL MEDICINE

## 2019-01-21 ENCOUNTER — APPOINTMENT (OUTPATIENT)
Dept: GENERAL RADIOLOGY | Facility: HOSPITAL | Age: 74
End: 2019-01-21

## 2019-01-21 ENCOUNTER — HOSPITAL ENCOUNTER (EMERGENCY)
Facility: HOSPITAL | Age: 74
Discharge: HOME | End: 2019-01-21
Attending: EMERGENCY MEDICINE | Admitting: EMERGENCY MEDICINE

## 2019-01-21 VITALS
SYSTOLIC BLOOD PRESSURE: 164 MMHG | TEMPERATURE: 97.9 F | WEIGHT: 214 LBS | HEART RATE: 94 BPM | HEIGHT: 62 IN | DIASTOLIC BLOOD PRESSURE: 83 MMHG | OXYGEN SATURATION: 95 % | BODY MASS INDEX: 39.38 KG/M2 | RESPIRATION RATE: 16 BRPM

## 2019-01-21 DIAGNOSIS — S80.812A ABRASION OF LEFT LOWER EXTREMITY, INITIAL ENCOUNTER: ICD-10-CM

## 2019-01-21 DIAGNOSIS — S63.502A SPRAIN OF LEFT WRIST, INITIAL ENCOUNTER: Primary | ICD-10-CM

## 2019-01-21 PROCEDURE — 25010000002 TDAP 5-2.5-18.5 LF-MCG/0.5 SUSPENSION: Performed by: EMERGENCY MEDICINE

## 2019-01-21 PROCEDURE — 90471 IMMUNIZATION ADMIN: CPT | Performed by: EMERGENCY MEDICINE

## 2019-01-21 PROCEDURE — 73090 X-RAY EXAM OF FOREARM: CPT

## 2019-01-21 PROCEDURE — 99283 EMERGENCY DEPT VISIT LOW MDM: CPT

## 2019-01-21 PROCEDURE — 90715 TDAP VACCINE 7 YRS/> IM: CPT | Performed by: EMERGENCY MEDICINE

## 2019-01-21 RX ADMIN — TETANUS TOXOID, REDUCED DIPHTHERIA TOXOID AND ACELLULAR PERTUSSIS VACCINE, ADSORBED 0.5 ML: 5; 2.5; 8; 8; 2.5 SUSPENSION INTRAMUSCULAR at 10:07

## 2019-01-21 NOTE — ED NOTES
Patient to er from home per ems with c/o she fell on ice today with injury to left wrist. No loc reported with this fall.      Domingo Mcknight RN  01/21/19 7484

## 2019-01-21 NOTE — ED NOTES
Abrasion to left leg cleaned with normal saline by RNConstance.  Abrasion dressed with bacitracin and island dressing.  Patient tolerated well.      Enrique Reyes RN  01/21/19 7476

## 2019-01-21 NOTE — ED PROVIDER NOTES
" EMERGENCY DEPARTMENT ENCOUNTER    Room Number:  07/07  PCP: Manuel Nuñez MD  Historian: Patient      HPI  Chief Complaint: Fall  Context: Prudence Zollinger is a 73 y.o. female who presents to the ED c/o left wrist pain s/p mechanical fall sustained earlier today. Pt reports that while pt was throwing away trash outside, pt slipped on some ice on the ground. Subsequently, pt fell onto her outstretched left hand. Pt denies head injury, LOC, abdominal pain, chest pain, dyspnea, nausea, vomiting, and neck pain. Pt reports that she is not currently taking any blood thinners. Tetanus injection status: Unknown. There are no other complaints at this time.       Pain Location: Left wrist  Radiation: None  Character: \"aching\"  Duration: Fall occurred earlier today  Severity: Moderate  Progression: Pain waxes and wanes  Aggravating Factors: Nothing  Alleviating Factors: Nothing          PAST MEDICAL HISTORY  Active Ambulatory Problems     Diagnosis Date Noted   • Major depressive disorder with single episode, in full remission (CMS/[facility]) 05/31/2018   • Acquired hypothyroidism 05/31/2018   • RLS (restless legs syndrome) 05/31/2018   • Familial hypercholesterolemia 05/31/2018   • Essential hypertension 05/31/2018   • Nausea 05/31/2018   • Degenerative tear of medial meniscus, right 12/20/2018   • Arthritis of right knee 12/20/2018   • Meniscus, lateral, derangement, right 12/20/2018   • Acute bilateral low back pain without sciatica 12/28/2018     Resolved Ambulatory Problems     Diagnosis Date Noted   • No Resolved Ambulatory Problems     Past Medical History:   Diagnosis Date   • Hyperlipidemia    • Hypertension    • Sleep apnea          PAST SURGICAL HISTORY  Past Surgical History:   Procedure Laterality Date   • CHOLECYSTECTOMY     • HYSTERECTOMY     • KNEE CARTILAGE SURGERY Bilateral    • WRIST SURGERY Left          FAMILY HISTORY  Family History   Problem Relation Age of Onset   • Cancer Mother         colon cancer "   • Heart failure Father    • Diabetes Brother    • Heart disease Brother    • Cancer Maternal Grandmother         colon   • Coronary artery disease Paternal Grandfather          SOCIAL HISTORY  Social History     Socioeconomic History   • Marital status:      Spouse name: Not on file   • Number of children: Not on file   • Years of education: Not on file   • Highest education level: Not on file   Social Needs   • Financial resource strain: Not on file   • Food insecurity - worry: Not on file   • Food insecurity - inability: Not on file   • Transportation needs - medical: Not on file   • Transportation needs - non-medical: Not on file   Occupational History   • Not on file   Tobacco Use   • Smoking status: Never Smoker   • Smokeless tobacco: Never Used   Substance and Sexual Activity   • Alcohol use: Yes     Comment: socially     • Drug use: No   • Sexual activity: Not on file   Other Topics Concern   • Not on file   Social History Narrative   • Not on file         ALLERGIES  Patient has no known allergies.        REVIEW OF SYSTEMS  Review of Systems   Constitutional: Negative.    Eyes: Negative for visual disturbance.   Respiratory: Negative for shortness of breath.    Cardiovascular: Negative for chest pain.   Gastrointestinal: Negative for abdominal pain, nausea and vomiting.   Musculoskeletal: Negative for neck pain.        Left wrist pain   Skin: Negative for rash.   Neurological: Negative for syncope, weakness, numbness and headaches.   Psychiatric/Behavioral: Negative.    All other systems reviewed and are negative.           PHYSICAL EXAM  ED Triage Vitals [01/21/19 0925]   Temp Heart Rate Resp BP SpO2   97.9 °F (36.6 °C) 84 18 (!) 186/84 97 %      Temp src Heart Rate Source Patient Position BP Location FiO2 (%)   Tympanic -- -- -- --         Physical Exam   Constitutional: She is oriented to person, place, and time. No distress.   HENT:   Head: Normocephalic and atraumatic.   Eyes: EOM are normal.  Pupils are equal, round, and reactive to light.   Neck: Normal range of motion. Neck supple.   Cardiovascular: Normal rate and intact distal pulses.   Pulses:       Radial pulses are 2+ on the right side, and 2+ on the left side.   Pulmonary/Chest: Effort normal. She exhibits no tenderness.   Abdominal: Soft. There is no tenderness.   Musculoskeletal: She exhibits edema (swelling to the distal left forearm).   C-Spine, T-Spine, and L-Spine are nontender. LLE is nontender.    Neurological: She is alert and oriented to person, place, and time. She has normal sensation and normal strength.   Skin: Skin is warm. Abrasion (on the left lower leg) noted.   Psychiatric: Mood and affect normal.   Nursing note and vitals reviewed.          RADIOLOGY  XR Forearm 2 View Left   Final Result    AP and lateral views were obtained. There is a well-healed fracture of  the distal radial metaphysis with internal fixation via a malleable  plate and multiple screws. There is no evidence of acute fracture or  subluxation. Prominent soft tissue swelling and edema and/or hemorrhage  is evident along the lateral aspect of the distal radius.           Ordered the above noted radiological studies. Reviewed by me in PACS.        PROCEDURES  Procedures      MEDICATIONS GIVEN IN ER  Medications   Tdap (BOOSTRIX) injection 0.5 mL (0.5 mL Intramuscular Given 1/21/19 1007)             PROGRESS AND CONSULTS  ED Course as of Jan 21 1054   Mon Jan 21, 2019   1045 10:46 AM  Patient with fall and wrist swelling.  X ray shows well healed fracture.  Will discharge home.  Ice, elevation.  Follow up with PMD.  [SL]      ED Course User Index  [SL] Brady Cotton MD       9:34 AM:  Left forearm X-Ray ordered for further evaluation. Tdap injection ordered for pt.     10:43 AM:  Rechecked pt. Pt is resting comfortably and appears in no acute distress. Informed pt that her left forearm X-Ray shows soft tissue swelling and an old well-healed fracture, but  nothing acute otherwise. Pt was advised ice/elevate area of pain and to take tylenol/ibuprofen for pain control. Pt was also advised to f/u with her PMD in the office. RTER warnings given. Pt agrees with plan for discharge.          MEDICAL DECISION MAKING      MDM  Number of Diagnoses or Management Options     Amount and/or Complexity of Data Reviewed  Tests in the radiology section of CPT®: reviewed and ordered (Left forearm X-Ray:  AP and lateral views were obtained. There is a well-healed fracture of  the distal radial metaphysis with internal fixation via a malleable  plate and multiple screws. There is no evidence of acute fracture or  subluxation. Prominent soft tissue swelling and edema and/or hemorrhage  is evident along the lateral aspect of the distal radius.)    Patient Progress  Patient progress: stable             DIAGNOSIS  Final diagnoses:   Sprain of left wrist, initial encounter   Abrasion of left lower extremity, initial encounter           DISPOSITION  Pt discharged.    DISCHARGE    Patient discharged in stable condition.    Reviewed implications of results, diagnosis, meds, responsibility to follow up, warning signs and symptoms of possible worsening, potential complications and reasons to return to ER.    Patient/Family voiced understanding of above instructions.    Discussed plan for discharge, as there is no emergent indication for admission. Pt/family is agreeable and understands need for follow up and repeat testing. Pt is aware that discharge does not mean that nothing is wrong but it indicates no emergency is present that requires admission and they must continue care with follow-up as given below or physician of their choice.     FOLLOW-UP  Manuel Nuñez MD  [address] [address]  [phone]    Schedule an appointment as soon as possible for a visit             Latest Documented Vital Signs:  As of 10:54 AM  BP- 164/83 HR- 94 Temp- 97.9 °F (36.6 °C)  (Tympanic) O2 sat- 95%        --  Documentation assistance provided by sarah Cohen for Dr. Yury MD.  Information recorded by the scribe was done at my direction and has been verified and validated by me.         Spike Cohen  01/21/19 8472       Brady Cotton MD  01/21/19 3993

## 2019-01-21 NOTE — ED TRIAGE NOTES
"Notes slipping on ice this morning when taking out the trash.  Patient notes trying to catch self on left wrist with fall, also notes scrape to left leg.  Patient notes that she has broken that wrist before, states, \"I think it has a thong in it, but I'm not sure.\"  Patient not sure of tetanus status.  Breathing is even and unlabored.  Patient denies hitting head or any LOC.  States \"my muscles are jumping\". Patient alert and oriented times 4.  NAD noted at this time.  Deformity noted to inner left forearm. Ring removed form left hand with lubricant by RN, Constance.  Ring placed in purse.   "

## 2019-01-31 ENCOUNTER — OFFICE VISIT (OUTPATIENT)
Dept: INTERNAL MEDICINE | Facility: CLINIC | Age: 74
End: 2019-01-31

## 2019-01-31 VITALS
BODY MASS INDEX: 38.64 KG/M2 | SYSTOLIC BLOOD PRESSURE: 128 MMHG | WEIGHT: 210 LBS | HEIGHT: 62 IN | DIASTOLIC BLOOD PRESSURE: 82 MMHG

## 2019-01-31 DIAGNOSIS — I10 ESSENTIAL HYPERTENSION: Chronic | ICD-10-CM

## 2019-01-31 DIAGNOSIS — T14.8XXA BRUISING: ICD-10-CM

## 2019-01-31 DIAGNOSIS — E78.01 FAMILIAL HYPERCHOLESTEROLEMIA: Primary | Chronic | ICD-10-CM

## 2019-01-31 DIAGNOSIS — E03.9 ACQUIRED HYPOTHYROIDISM: ICD-10-CM
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
## 2019-01-31 PROCEDURE — 99214 OFFICE O/P EST MOD 30 MIN: CPT | Performed by: INTERNAL MEDICINE

## 2019-01-31 NOTE — PROGRESS NOTES
Subjective   Prudence Zollinger is a 73 y.o. female. Presents with a chief complaint of having fallen recently, concurrently has issues with HTN, Hyperlipidemia, Hypothyroidism, here for ER follow up.    History of Present Illness patient recently slipped on the ice and injured her left forearm and left lower extremity    The following portions of the patient's history were reviewed and updated as appropriate: allergies, current medications, past family history, past medical history, past social history, past surgical history and problem list.    Review of Systems   Constitutional: Positive for fatigue.   HENT: Negative.    Eyes: Negative.    Respiratory: Negative.    Cardiovascular: Negative.    Gastrointestinal: Negative.    Endocrine: Negative.    Genitourinary: Negative.    Musculoskeletal: Positive for arthralgias.   Skin: Positive for bruise.   Allergic/Immunologic: Negative.    Neurological: Negative.    Hematological: Negative.    Psychiatric/Behavioral: Negative.        Objective   Physical Exam   Constitutional: She appears well-developed and well-nourished.   HENT:   Head: Normocephalic and atraumatic.   Eyes: EOM are normal. Pupils are equal, round, and reactive to light.   Neck: Normal range of motion.   Cardiovascular: Normal rate, regular rhythm and normal heart sounds.   Pulmonary/Chest: Effort normal and breath sounds normal.   Abdominal: Soft. Bowel sounds are normal.   Musculoskeletal:   Left forearm bruise  Left lower extremity bruise   Psychiatric: She has a normal mood and affect. Her behavior is normal. Judgment and thought content normal.   Nursing note and vitals reviewed.        Assessment/Plan   Prudence was seen today for knot on l wrist.    Diagnoses and all orders for this visit:    Familial hypercholesterolemia  Comments:  no change in therapy    Essential hypertension  Comments:  well controlled    Acquired hypothyroidism  Comments:  well controlled    Fall, subsequent  encounter  Comments:  i have reviewed all of her hospital records    Bruising  Comments:  slowly improving

## 2019-03-18 RX ORDER — TRAMADOL HYDROCHLORIDE 50 MG/1
TABLET ORAL
Qty: 30 TABLET | Refills: 1 | Status: SHIPPED | OUTPATIENT
Start: 2019-03-18 | End: 2019-05-16 | Stop reason: SDUPTHER

## 2019-03-18 RX ORDER — GABAPENTIN 800 MG/1
TABLET ORAL
Qty: 90 TABLET | Refills: 0 | Status: SHIPPED | OUTPATIENT
Start: 2019-03-18 | End: 2019-06-13 | Stop reason: SDUPTHER

## 2019-04-01 DIAGNOSIS — I10 ESSENTIAL HYPERTENSION: ICD-10-CM

## 2019-04-01 RX ORDER — VERAPAMIL HYDROCHLORIDE 300 MG/1
CAPSULE, EXTENDED RELEASE ORAL
Qty: 30 CAPSULE | Refills: 4 | Status: SHIPPED | OUTPATIENT
Start: 2019-04-01 | End: 2019-09-05 | Stop reason: SDUPTHER

## 2019-05-16 RX ORDER — TRAMADOL HYDROCHLORIDE 50 MG/1
TABLET ORAL
Qty: 30 TABLET | Refills: 1 | Status: SHIPPED | OUTPATIENT
Start: 2019-05-16 | End: 2019-07-24 | Stop reason: SDUPTHER

## 2019-05-16 NOTE — TELEPHONE ENCOUNTER
----- Message from Lo Zuniga sent at 5/16/2019  9:37 AM EDT -----  Contact: pt  Pt is calling for a refill   FOR  traMADol (ULTRAM) 50 MG tablet    Patient requests RX SENT TO   [facility] [address] [place] [address] [address] [facility] - [phone] [facility] [phone] FX    Caller# [phone]

## 2019-06-10 RX ORDER — ESCITALOPRAM OXALATE 20 MG/1
TABLET ORAL
Qty: 90 TABLET | Refills: 3 | Status: SHIPPED | OUTPATIENT
Start: 2019-06-10 | End: 2020-04-29 | Stop reason: SDUPTHER

## 2019-06-13 RX ORDER — GABAPENTIN 800 MG/1
TABLET ORAL
Qty: 90 TABLET | Refills: 0 | Status: SHIPPED | OUTPATIENT
Start: 2019-06-13 | End: 2019-09-19 | Stop reason: SDUPTHER

## 2019-07-25 RX ORDER — TRAMADOL HYDROCHLORIDE 50 MG/1
TABLET ORAL
Qty: 30 TABLET | Refills: 0 | OUTPATIENT
Start: 2019-07-25 | End: 2019-08-29 | Stop reason: SDUPTHER

## 2019-08-30 RX ORDER — TRAMADOL HYDROCHLORIDE 50 MG/1
TABLET ORAL
Qty: 30 TABLET | Refills: 0 | Status: SHIPPED | OUTPATIENT
Start: 2019-08-30 | End: 2019-09-27 | Stop reason: SDUPTHER

## 2019-09-05 DIAGNOSIS — I10 ESSENTIAL HYPERTENSION: ICD-10-CM

## 2019-09-05 RX ORDER — VERAPAMIL HYDROCHLORIDE 300 MG/1
CAPSULE, EXTENDED RELEASE ORAL
Qty: 30 CAPSULE | Refills: 3 | Status: SHIPPED | OUTPATIENT
Start: 2019-09-05 | End: 2019-09-11 | Stop reason: SDUPTHER

## 2019-09-11 ENCOUNTER — TELEPHONE (OUTPATIENT)
Dept: INTERNAL MEDICINE | Facility: CLINIC | Age: 74
End: 2019-09-11

## 2019-09-11 DIAGNOSIS — I10 ESSENTIAL HYPERTENSION: ICD-10-CM

## 2019-09-11 RX ORDER — VERAPAMIL HYDROCHLORIDE 300 MG/1
300 CAPSULE, EXTENDED RELEASE ORAL NIGHTLY
Qty: 30 CAPSULE | Refills: 0 | Status: SHIPPED | OUTPATIENT
Start: 2019-09-11 | End: 2020-01-09

## 2019-09-11 NOTE — TELEPHONE ENCOUNTER
----- Message from Lo Zuniga sent at 9/11/2019  9:01 AM EDT -----  Contact: pt  Pt is calling for a refill   verapamil (VERELAN) 300 MG capsule sustained-release 24 hr 24 hr capsule    Patient requests RX SENT TO   [facility] [address] [place] [address] [address] [place] [address] AT [address] - [phone] [facility] [phone] FX      Caller# [phone]     Please and thank you.

## 2019-09-19 DIAGNOSIS — M54.50 ACUTE BILATERAL LOW BACK PAIN WITHOUT SCIATICA: Primary | ICD-10-CM

## 2019-09-19 RX ORDER — GABAPENTIN 800 MG/1
800 TABLET ORAL DAILY
Qty: 90 TABLET | Refills: 0 | Status: SHIPPED | OUTPATIENT
Start: 2019-09-19 | End: 2019-10-24 | Stop reason: SDUPTHER

## 2019-09-19 NOTE — TELEPHONE ENCOUNTER
----- Message from Mary Orozco sent at 9/19/2019 11:47 AM EDT -----  Pt needs refill on her Gabapentin (NEURONTIN) 800 MG tablet #90  Sig: TAKE ONE TABLET BY MOUTH DAILY     Please send to [facility] [address] [place] [address] - [phone] [facility] [address] [facility] - [phone]  - [phone] FX

## 2019-09-19 NOTE — TELEPHONE ENCOUNTER
Pt has gideon with you today but we had to cancel it, she is coming 10/01 for f/u gideon. Last ov 01/31.

## 2019-09-23 RX ORDER — LEVOTHYROXINE SODIUM 0.15 MG/1
TABLET ORAL
Qty: 30 TABLET | Refills: 0 | Status: SHIPPED | OUTPATIENT
Start: 2019-09-23 | End: 2019-10-01 | Stop reason: SDUPTHER

## 2019-09-25 RX ORDER — PRAMIPEXOLE DIHYDROCHLORIDE 0.12 MG/1
TABLET ORAL
Qty: 90 TABLET | Refills: 2 | Status: SHIPPED | OUTPATIENT
Start: 2019-09-25 | End: 2020-04-29 | Stop reason: SDUPTHER

## 2019-09-27 RX ORDER — TRAMADOL HYDROCHLORIDE 50 MG/1
TABLET ORAL
Qty: 30 TABLET | Refills: 2 | Status: SHIPPED | OUTPATIENT
Start: 2019-09-27 | End: 2019-12-10 | Stop reason: SDUPTHER

## 2019-10-01 ENCOUNTER — OFFICE VISIT (OUTPATIENT)
Dept: INTERNAL MEDICINE | Facility: CLINIC | Age: 74
End: 2019-10-01

## 2019-10-01 VITALS
HEIGHT: 62 IN | OXYGEN SATURATION: 97 % | BODY MASS INDEX: 40.48 KG/M2 | DIASTOLIC BLOOD PRESSURE: 64 MMHG | HEART RATE: 65 BPM | WEIGHT: 220 LBS | SYSTOLIC BLOOD PRESSURE: 104 MMHG

## 2019-10-01 DIAGNOSIS — M54.50 ACUTE BILATERAL LOW BACK PAIN WITHOUT SCIATICA: ICD-10-CM

## 2019-10-01 DIAGNOSIS — G25.81 RLS (RESTLESS LEGS SYNDROME): ICD-10-CM

## 2019-10-01 DIAGNOSIS — E78.01 FAMILIAL HYPERCHOLESTEROLEMIA: Primary | Chronic | ICD-10-CM

## 2019-10-01 DIAGNOSIS — E03.9 ACQUIRED HYPOTHYROIDISM: ICD-10-CM

## 2019-10-01 DIAGNOSIS — I10 ESSENTIAL HYPERTENSION: ICD-10-CM

## 2019-10-01 DIAGNOSIS — F32.5 MAJOR DEPRESSIVE DISORDER WITH SINGLE EPISODE, IN FULL REMISSION (HCC): ICD-10-CM

## 2019-10-01 PROCEDURE — 99214 OFFICE O/P EST MOD 30 MIN: CPT | Performed by: INTERNAL MEDICINE

## 2019-10-01 NOTE — PROGRESS NOTES
Lorenzo Holloway Zollinger is a 74 y.o. female.  Presents with chief complaint of worsening low back pain, essential hypertension, hyperlipidemia, acquired hypothyroidism, restless leg syndrome, major depressive disorder that is well controlled here for evaluation and treatment.  Several years ago the patient was told that she had spinal canal stenosis and received a series of epidural injections which helped at the time but she is experiencing similar symptoms that are not responding to conservative therapy.  I recommended an MRI of the back and evaluation by our physical therapy department.  Depending on the results of the MRI I may refer her for neurosurgical intervention.    History of Present Illness the patient is experiencing much worse low back pain    The following portions of the patient's history were reviewed and updated as appropriate: allergies, current medications, past family history, past medical history, past social history, past surgical history and problem list.    Review of Systems   Constitutional: Positive for fatigue.   HENT: Negative.    Eyes: Negative.    Respiratory: Negative.    Cardiovascular: Negative.    Gastrointestinal: Negative.    Endocrine: Negative.    Genitourinary: Negative.    Musculoskeletal: Positive for arthralgias and back pain.   Skin: Negative.    Allergic/Immunologic: Negative.    Neurological: Negative.    Hematological: Negative.    Psychiatric/Behavioral: Negative.        Objective   Physical Exam   Constitutional: She is oriented to person, place, and time. She appears well-developed and well-nourished.   HENT:   Head: Normocephalic and atraumatic.   Eyes: EOM are normal. Pupils are equal, round, and reactive to light.   Neck: Normal range of motion. Neck supple.   Cardiovascular: Normal rate, regular rhythm and normal heart sounds.   Pulmonary/Chest: Effort normal and breath sounds normal.   Abdominal: Soft. Bowel sounds are normal.   Musculoskeletal:    Chronic severe low back pain with radiation of both legs   Neurological: She is alert and oriented to person, place, and time.   Absent deep tendon reflexes of her legs   Skin: Skin is warm.   Psychiatric: She has a normal mood and affect. Her behavior is normal. Judgment and thought content normal.   Nursing note and vitals reviewed.        Assessment/Plan   Prudence was seen today for hypertension, hyperlipidemia and hypothyroidism.    Diagnoses and all orders for this visit:    Familial hypercholesterolemia  Comments:  well controlled    Essential hypertension  Comments:  well controlled    Acquired hypothyroidism  Comments:  will check a TSH    RLS (restless legs syndrome)  Comments:  continue current therapy    Major depressive disorder with single episode, in full remission (CMS/[facility])  Comments:  doing very well    Acute bilateral low back pain without sciatica  -     MRI Lumbar Spine With & Without Contrast; Future  -     Ambulatory Referral to Physical Therapy Evaluate and treat

## 2019-10-17 ENCOUNTER — HOSPITAL ENCOUNTER (OUTPATIENT)
Dept: MRI IMAGING | Facility: HOSPITAL | Age: 74
Discharge: HOME | End: 2019-10-17
Admitting: INTERNAL MEDICINE

## 2019-10-17 DIAGNOSIS — M54.50 ACUTE BILATERAL LOW BACK PAIN WITHOUT SCIATICA: ICD-10-CM

## 2019-10-17 PROCEDURE — 72158 MRI LUMBAR SPINE W/O & W/DYE: CPT

## 2019-10-17 PROCEDURE — 82565 ASSAY OF CREATININE: CPT

## 2019-10-17 PROCEDURE — 0 GADOBENATE DIMEGLUMINE 529 MG/ML SOLUTION: Performed by: INTERNAL MEDICINE

## 2019-10-17 PROCEDURE — A9577 INJ MULTIHANCE: HCPCS | Performed by: INTERNAL MEDICINE

## 2019-10-17 RX ADMIN — GADOBENATE DIMEGLUMINE 20 ML: 529 INJECTION, SOLUTION INTRAVENOUS at 21:02

## 2019-10-18 ENCOUNTER — HOSPITAL ENCOUNTER (OUTPATIENT)
Dept: PHYSICAL THERAPY | Facility: HOSPITAL | Age: 74
Setting detail: THERAPIES SERIES
Discharge: HOME | End: 2019-10-18

## 2019-10-18 DIAGNOSIS — G89.29 CHRONIC BILATERAL LOW BACK PAIN WITHOUT SCIATICA: ICD-10-CM

## 2019-10-18 DIAGNOSIS — M54.50 ACUTE BILATERAL LOW BACK PAIN WITHOUT SCIATICA: Primary | ICD-10-CM

## 2019-10-18 DIAGNOSIS — M54.50 CHRONIC BILATERAL LOW BACK PAIN WITHOUT SCIATICA: ICD-10-CM

## 2019-10-18 LAB — CREAT BLDA-MCNC: 0.9 MG/DL (ref 0.6–1.3)

## 2019-10-18 PROCEDURE — 97161 PT EVAL LOW COMPLEX 20 MIN: CPT | Performed by: PHYSICAL THERAPIST

## 2019-10-18 PROCEDURE — 97110 THERAPEUTIC EXERCISES: CPT | Performed by: PHYSICAL THERAPIST

## 2019-10-18 NOTE — THERAPY EVALUATION
Outpatient Physical Therapy Ortho Initial Evaluation  [facility]     Patient Name: Prudence Zollinger  : 1945  MRN: [id number]  Today's Date: 10/18/2019      Visit Date: 10/18/2019    Patient Active Problem List   Diagnosis   • Major depressive disorder with single episode, in full remission (CMS/HCC)   • Acquired hypothyroidism   • RLS (restless legs syndrome)   • Familial hypercholesterolemia   • Essential hypertension   • Nausea   • Degenerative tear of medial meniscus, right   • Arthritis of right knee   • Meniscus, lateral, derangement, right   • Acute bilateral low back pain without sciatica   • Fall   • Bruising        Past Medical History:   Diagnosis Date   • Hyperlipidemia    • Hypertension    • Sleep apnea         Past Surgical History:   Procedure Laterality Date   • CHOLECYSTECTOMY     • HYSTERECTOMY     • KNEE CARTILAGE SURGERY Bilateral    • WRIST SURGERY Left        Visit Dx:     ICD-10-CM ICD-9-CM   1. Acute bilateral low back pain without sciatica M54.5 724.2     338.19   2. Chronic bilateral low back pain without sciatica M54.5 724.2    G89.29 338.29         Patient History     Row Name 10/18/19 0900             History    Chief Complaint  Pain;Difficulty with daily activities  -GR      Type of Pain  Back pain  -GR      Date Current Problem(s) Began  19  -GR      Brief Description of Current Complaint  H/o LBP starting in  with herniated discs and 3 lumbar injections.  Over time her condition worsened due to calcification and she was referred to pain management.  She has been on pain medication since this time.  She ha sincreasing difficulty with tending to her animals, ie feeding and changing litterbox.  If she lays down she gets relief.  She states her legs will go numb if sitting for long periods, such as when driving.  She has had a heat sensation on the outside of her thighs for 4 years, at one time she used a cream, to help alleviate her symptoms but d/c'd 2/2 cost.  She thinks she may have done PT around 2006 for the back but it has been so long she can't remember, maybe it helped a little.  Her acute flare-up of this condition is over the last few months. She does have a membership to [employer]; tried 6 visits using the treadmill, but flared up shin splints so has been leary to return.  Not formally exercising at the moment. She is  and lives alone.  -GR      Occupation/sports/leisure activities  gardening, caring for cats/dogs  -GR      What clinical tests have you had for this problem?  MRI  -GR      Results of Clinical Tests  pending (done on 10/17/19)  -GR      Are you or can you be pregnant  No  -GR         Pain     Pain Location  Back  -GR      Pain at Present  5  -GR      Pain at Best  5  -GR      Pain at Worst  10  -GR      Is your sleep disturbed?  Yes  -GR      Is medication used to assist with sleep?  Yes  -GR         Fall Risk Assessment    Any falls in the past year:  No last fall in 2018 taking out garbage on ice  -GR      Does patient have a fear of falling  Yes (comment) has a cane for safety PRN  -GR         Services    Do you plan to receive Home Health services in the near future  No  -GR         Daily Activities    Primary Language  English  -GR      How does patient learn best?  Listening;Reading  -GR      Pt Participated in POC and Goals  Yes  -GR         Safety    Are you being hurt, hit, or frightened by anyone at home or in your life?  No  -GR      Are you being neglected by a caregiver  No  -GR        User Key  (r) = Recorded By, (t) = Taken By, (c) = Cosigned By    Initials Name Provider Type    Edvin Knowles, PT Physical Therapist          PT Ortho     Row Name 10/18/19 0900       Quarter Clearing    Quarter Clearing  Lower Quarter Clearing  -GR       DTR- Lower Quarter Clearing    Patellar tendon (L2-4)  Bilateral:;1- Minimal response  -GR    Achilles tendon (S1-2)  Bilateral:;2- Normal response  -GR       Neural Tension Signs-  Lower Quarter Clearing    SLR  Right:;Positive passive SLR  -GR       Myotomal Screen- Lower Quarter Clearing    Hip flexion (L2)  Bilateral:;4 (Good)  -GR    Knee extension (L3)  Bilateral:;4 (Good)  -GR    Ankle DF (L4)  Bilateral:;4 (Good)  -GR    Great toe extension (L5)  Right:;4- (Good -);Left:;4 (Good)  -GR    Ankle PF (S1)  Bilateral:;4 (Good)  -GR    Knee flexion (S2)  Bilateral:;4 (Good)  -GR       Lumbar ROM Screen- Lower Quarter Clearing    Lumbar Flexion  Impaired to distal tibia with paresthesias reported and hs tightness  -GR    Lumbar Extension  Impaired 25% impaired  -GR    Lumbar Lateral Flexion  Impaired R to lateral jt line knee; L to mid femur  -GR    Lumbar Rotation  Normal  -GR       Sensation    Sensation WNL?  WNL  -GR       Balance Skills Training    SLS  R 3 sec L 5 sec  -GR      User Key  (r) = Recorded By, (t) = Taken By, (c) = Cosigned By    Initials Name Provider Type    GR Edvin Chaudhry, PT Physical Therapist                      Therapy Education  Education Details: Role of outpatient PT, POC, differential diagnosis, initial HEP, expectations.  Given: HEP  Program: New  How Provided: Verbal, Demonstration, Written  Provided to: Patient  Level of Understanding: Teach back education performed, Demonstrated, Verbalized     PT OP Goals     Row Name 10/18/19 0900          PT Short Term Goals    STG Date to Achieve  11/02/19  -GR     STG 1  Patient will be independent with initial HEP.  -GR     STG 1 Progress  New  -GR     STG 2  Patient will report standing tolerance, in a shopping line, to 10 minutes or greater.  -     STG 2 Progress  New  -        Long Term Goals    LTG Date to Achieve  12/02/19  -     LTG 1  Patient will be independent with progressive HEP for long term management of current condition.  -GR     LTG 1 Progress  New  -GR     LTG 2  Patient will score </= 56% disability on the modified PARVEEN to indicate improved perceived performance with ADLs.  -GR     LTG 2  Progress  New  -GR     LTG 3  Patient will demonstrate safe lifting/transfer mechanics for long term spine preservation.  -GR     LTG 3 Progress  New  -GR     LTG 4  Standing/walking tolerance to 30 minutes or greater for community integration.  -GR     LTG 4 Progress  New  -GR     LTG 5  Report improved ease with donning/doffing footwear by 75% or greater.  -GR        Time Calculation    PT Goal Re-Cert Due Date  01/16/20  -GR       User Key  (r) = Recorded By, (t) = Taken By, (c) = Cosigned By    Initials Name Provider Type    GR Edvin Chaudhry, PT Physical Therapist          PT Assessment/Plan     Row Name 10/18/19 1051          PT Assessment    Functional Limitations  Limitations in functional capacity and performance;Performance in leisure activities;Limitations in community activities  -GR     Impairments  Balance;Pain;Posture;Range of motion;Muscle strength;Joint mobility;Poor body mechanics  -GR     Assessment Comments  74 y.o. female referred to outpatient physical therapy for evaluation and treatment of bilateral LBP, acute flare-up of chronic condition.  Patient presents with increased lumbar lordosis, decreased core and glut strength and impaired end range lumbar AROM. Signs and symptoms are consistent with referring diagnosis.  She appears to have stenosis comorbid to disc herniations. Pertinent comorbidities and personal factors that may affect progress include, but are not limited to, chronic LBP, HTN, arthritis, falls.  This condition is stable. Recommend skilled PT to address functional deficits. Thank you for this referral.  -GR     Please refer to paper survey for additional self-reported information  Yes  -GR     Rehab Potential  Good  -GR     Patient/caregiver participated in establishment of treatment plan and goals  Yes  -GR     Patient would benefit from skilled therapy intervention  Yes  -GR        PT Plan    PT Frequency  2x/week  -GR     Predicted Duration of Therapy Intervention  (Therapy Eval)  8 visits  -GR     Planned CPT's?  PT EVAL LOW COMPLEXITY: 54724;PT RE-EVAL: 50968;PT THER PROC EA 15 MIN: 93301;PT THER ACT EA 15 MIN: 38900;PT MANUAL THERAPY EA 15 MIN: 03378;PT NEUROMUSC RE-EDUCATION EA 15 MIN: 96235;PT HOT OR COLD PACK TREAT MCARE;PT SELF CARE/HOME MGMT/TRAIN EA 15: 79082;PT ELECTRICAL STIM UNATTEND: ;PT ELECTRICAL STIM ATTD EA 15 MIN: 67014;PT TRACTION LUMBAR: 98898  -GR     Physical Therapy Interventions (Optional Details)  ROM (Range of Motion);strengthening;stretching;modalities;neuromuscular re-education;manual therapy techniques;home exercise program;joint mobilization  -GR     PT Plan Comments  Begin on nustep. Needs a balance of flexion/extension and core stabilization.  Review HEP and advance to bridges, piriformis stretch, HL clam.  -GR       User Key  (r) = Recorded By, (t) = Taken By, (c) = Cosigned By    Initials Name Provider Type    Edvin Knowles, PT Physical Therapist            OP Exercises     Row Name 10/18/19 1000             Total Minutes    32270 - PT Therapeutic Exercise Minutes  10  -GR         Exercise 1    Exercise Name 1  90/90 decompression  -GR      Time 1  2 minutes  -GR      Additional Comments  while educating  -GR         Exercise 2    Exercise Name 2  PPT  -GR      Cueing 2  Demo  -GR      Sets 2  1  -GR      Reps 2  10  -GR      Time 2  5 sec  -GR         Exercise 3    Exercise Name 3  LTR  -GR      Cueing 3  Demo  -GR      Sets 3  1  -GR      Reps 3  15  -GR         Exercise 4    Exercise Name 4  H/L adduction squeeze  -GR      Cueing 4  Demo  -GR      Sets 4  1  -GR      Reps 4  10  -GR      Time 4  5 sec  -GR      Additional Comments  pillow  -GR         Exercise 5    Exercise Name 5  Seated HS stretch  -GR      Cueing 5  Demo  -GR      Sets 5  1  -GR      Reps 5  3  -GR      Time 5  20 sec  -GR        User Key  (r) = Recorded By, (t) = Taken By, (c) = Cosigned By    Initials Name Provider Type    Edvin Knowles, PT Physical  Therapist                        Outcome Measure Options: Modifed Owestry  Modified Oswestry  Modified Oswestry Score/Comments: 66% disability      Time Calculation:     Start Time: 0930  Stop Time: 1014  Time Calculation (min): 44 min  Total Timed Code Minutes- PT: 10 minute(s)     Therapy Charges for Today     Code Description Service Date Service Provider Modifiers Qty    42254236203  PT THER PROC EA 15 MIN 10/18/2019 Edvin Chaudhry, PT GP 1    [id number]  PT EVAL LOW COMPLEXITY 2 10/18/2019 Edvin Chaudhry, PT GP 1          PT G-Codes  Outcome Measure Options: Modifed Owestry  Modified Oswestry Score/Comments: 66% disability         Edvin Chaudhry, PT  10/18/2019

## 2019-10-22 ENCOUNTER — OFFICE VISIT CONVERTED (OUTPATIENT)
Dept: CARDIOLOGY | Facility: CLINIC | Age: 74
End: 2019-10-22
Attending: SPECIALIST

## 2019-10-24 DIAGNOSIS — M54.50 ACUTE BILATERAL LOW BACK PAIN WITHOUT SCIATICA: ICD-10-CM

## 2019-10-24 RX ORDER — LEVOTHYROXINE SODIUM 0.15 MG/1
150 TABLET ORAL DAILY
Qty: 90 TABLET | Refills: 1 | Status: SHIPPED | OUTPATIENT
Start: 2019-10-24 | End: 2020-04-29 | Stop reason: SDUPTHER

## 2019-10-24 RX ORDER — GABAPENTIN 800 MG/1
800 TABLET ORAL DAILY
Qty: 90 TABLET | Refills: 0 | Status: SHIPPED | OUTPATIENT
Start: 2019-10-24 | End: 2020-03-24

## 2019-10-28 ENCOUNTER — APPOINTMENT (OUTPATIENT)
Dept: PHYSICAL THERAPY | Facility: HOSPITAL | Age: 74
End: 2019-10-28

## 2019-10-28 RX ORDER — LEVOTHYROXINE SODIUM 0.15 MG/1
TABLET ORAL
Qty: 90 TABLET | Refills: 2 | Status: SHIPPED | OUTPATIENT
Start: 2019-10-28 | End: 2020-01-09 | Stop reason: SDUPTHER

## 2019-10-30 ENCOUNTER — APPOINTMENT (OUTPATIENT)
Dept: PHYSICAL THERAPY | Facility: HOSPITAL | Age: 74
End: 2019-10-30

## 2019-11-04 RX ORDER — TELMISARTAN 80 MG/1
TABLET ORAL
Qty: 90 TABLET | Refills: 2 | Status: SHIPPED | OUTPATIENT
Start: 2019-11-04 | End: 2020-04-29 | Stop reason: SDUPTHER

## 2019-11-06 ENCOUNTER — HOSPITAL ENCOUNTER (OUTPATIENT)
Dept: PHYSICAL THERAPY | Facility: HOSPITAL | Age: 74
Setting detail: THERAPIES SERIES
Discharge: HOME | End: 2019-11-06

## 2019-11-06 DIAGNOSIS — M54.50 CHRONIC BILATERAL LOW BACK PAIN WITHOUT SCIATICA: ICD-10-CM

## 2019-11-06 DIAGNOSIS — G89.29 CHRONIC BILATERAL LOW BACK PAIN WITHOUT SCIATICA: ICD-10-CM

## 2019-11-06 DIAGNOSIS — M54.50 ACUTE BILATERAL LOW BACK PAIN WITHOUT SCIATICA: Primary | ICD-10-CM

## 2019-11-06 PROCEDURE — 97110 THERAPEUTIC EXERCISES: CPT

## 2019-11-06 NOTE — THERAPY TREATMENT NOTE
Outpatient Physical Therapy Ortho Treatment Note  [facility]     Patient Name: Prudence Zollinger  : 1945  MRN: [id number]  Today's Date: 2019      Visit Date: 2019    Visit Dx:    ICD-10-CM ICD-9-CM   1. Acute bilateral low back pain without sciatica M54.5 724.2     338.19   2. Chronic bilateral low back pain without sciatica M54.5 724.2    G89.29 338.29       Patient Active Problem List   Diagnosis   • Major depressive disorder with single episode, in full remission (CMS/HCC)   • Acquired hypothyroidism   • RLS (restless legs syndrome)   • Familial hypercholesterolemia   • Essential hypertension   • Nausea   • Degenerative tear of medial meniscus, right   • Arthritis of right knee   • Meniscus, lateral, derangement, right   • Acute bilateral low back pain without sciatica   • Fall   • Bruising        Past Medical History:   Diagnosis Date   • Hyperlipidemia    • Hypertension    • Sleep apnea         Past Surgical History:   Procedure Laterality Date   • CHOLECYSTECTOMY     • HYSTERECTOMY     • KNEE CARTILAGE SURGERY Bilateral    • WRIST SURGERY Left                        PT Assessment/Plan     Row Name 19 1017          PT Assessment    Assessment Comments  Instruced on log roll. Reports supine 90/90 gives relief. Tolerating new exercises  -WS       User Key  (r) = Recorded By, (t) = Taken By, (c) = Cosigned By    Initials Name Provider Type    Donald Monae PTA Physical Therapy Assistant            OP Exercises     Row Name 19 0940             Subjective Comments    Subjective Comments  back pain about the same  -WS         Subjective Pain    Able to rate subjective pain?  yes  -WS      Pre-Treatment Pain Level  5  -WS         Total Minutes    55855 - PT Therapeutic Exercise Minutes  30  -WS         Exercise 1    Exercise Name 1  90/90 decompression  -WS      Time 1  2 minutes  -WS         Exercise 2    Exercise Name 2  PPT  -WS      Cueing 2  Demo  -WS      Sets 2  2   -WS      Reps 2  10  -WS      Time 2  5 sec  -WS         Exercise 3    Exercise Name 3  LTR  -WS      Cueing 3  Demo  -WS      Sets 3  1  -WS      Reps 3  15  -WS         Exercise 4    Exercise Name 4  H/L adduction squeeze  -WS      Cueing 4  Demo  -WS      Sets 4  2  -WS      Reps 4  10  -WS      Time 4  5 sec  -WS      Additional Comments  ball  -WS         Exercise 5    Exercise Name 5  Seated HS stretch  -WS      Cueing 5  Demo  -WS      Sets 5  1  -WS      Reps 5  3  -WS      Time 5  20 sec  -WS         Exercise 6    Exercise Name 6  Nustep UE/LE L5  -WS      Reps 6  5 min  -WS         Exercise 7    Exercise Name 7  piriformis stretch  -WS      Sets 7  3  -WS      Reps 7  20  -WS         Exercise 8    Exercise Name 8  Supine hip abd with TA  -WS      Sets 8  2  -WS      Reps 8  10  -WS      Additional Comments  RTB  -WS         Exercise 9    Exercise Name 9  consider bridge  -WS         Exercise 10    Exercise Name 10  add RTB scap ret  -WS         Exercise 11    Exercise Name 11  DNTC with swiss ball  -WS        User Key  (r) = Recorded By, (t) = Taken By, (c) = Cosigned By    Initials Name Provider Type    Donald Monae PTA Physical Therapy Assistant                       PT OP Goals     Row Name 11/06/19 1000          PT Short Term Goals    STG Date to Achieve  11/02/19  -WS     STG 1  Patient will be independent with initial HEP.  -WS     STG 1 Progress  Ongoing  -WS     STG 2  Patient will report standing tolerance, in a shopping line, to 10 minutes or greater.  -WS     STG 2 Progress  Progressing  -WS        Long Term Goals    LTG Date to Achieve  12/02/19  -WS     LTG 1  Patient will be independent with progressive HEP for long term management of current condition.  -WS     LTG 1 Progress  New  -WS     LTG 2  Patient will score </= 56% disability on the modified PARVEEN to indicate improved perceived performance with ADLs.  -WS     LTG 2 Progress  New  -WS     LTG 3  Patient will demonstrate safe  lifting/transfer mechanics for long term spine preservation.  -WS     LTG 3 Progress  New  -WS     LTG 4  Standing/walking tolerance to 30 minutes or greater for community integration.  -WS     LTG 4 Progress  New  -WS     LTG 5  Report improved ease with donning/doffing footwear by 75% or greater.  -       User Key  (r) = Recorded By, (t) = Taken By, (c) = Cosigned By    Initials Name Provider Type    Donald Monae PTA Physical Therapy Assistant          Therapy Education  Given: HEP, Symptoms/condition management, Pain management, Posture/body mechanics  Program: Reinforced  How Provided: Verbal  Provided to: Patient              Time Calculation:   Start Time: 0940  Stop Time: 1010  Time Calculation (min): 30 min  Therapy Charges for Today     Code Description Service Date Service Provider Modifiers Qty    28332841914 HC PT THER PROC EA 15 MIN 11/6/2019 Donald Powell PTA GP 2                    Donald Powell PTA  11/6/2019

## 2019-11-08 ENCOUNTER — HOSPITAL ENCOUNTER (OUTPATIENT)
Dept: PHYSICAL THERAPY | Facility: HOSPITAL | Age: 74
Setting detail: THERAPIES SERIES
Discharge: HOME | End: 2019-11-08

## 2019-11-08 DIAGNOSIS — G89.29 CHRONIC BILATERAL LOW BACK PAIN WITHOUT SCIATICA: ICD-10-CM

## 2019-11-08 DIAGNOSIS — M54.50 CHRONIC BILATERAL LOW BACK PAIN WITHOUT SCIATICA: ICD-10-CM

## 2019-11-08 DIAGNOSIS — M54.50 ACUTE BILATERAL LOW BACK PAIN WITHOUT SCIATICA: Primary | ICD-10-CM

## 2019-11-08 PROCEDURE — 97110 THERAPEUTIC EXERCISES: CPT

## 2019-11-08 NOTE — THERAPY TREATMENT NOTE
Outpatient Physical Therapy Ortho Treatment Note  [facility]     Patient Name: Prudence Zollinger  : 1945  MRN: [id number]  Today's Date: 2019      Visit Date: 2019    Visit Dx:    ICD-10-CM ICD-9-CM   1. Acute bilateral low back pain without sciatica M54.5 724.2     338.19   2. Chronic bilateral low back pain without sciatica M54.5 724.2    G89.29 338.29       Patient Active Problem List   Diagnosis   • Major depressive disorder with single episode, in full remission (CMS/HCC)   • Acquired hypothyroidism   • RLS (restless legs syndrome)   • Familial hypercholesterolemia   • Essential hypertension   • Nausea   • Degenerative tear of medial meniscus, right   • Arthritis of right knee   • Meniscus, lateral, derangement, right   • Acute bilateral low back pain without sciatica   • Fall   • Bruising        Past Medical History:   Diagnosis Date   • Hyperlipidemia    • Hypertension    • Sleep apnea         Past Surgical History:   Procedure Laterality Date   • CHOLECYSTECTOMY     • HYSTERECTOMY     • KNEE CARTILAGE SURGERY Bilateral    • WRIST SURGERY Left                        PT Assessment/Plan     Row Name 19 0818          PT Assessment    Assessment Comments  Decreased back pain. Cues for log roll. Next consider bridge  -WS       User Key  (r) = Recorded By, (t) = Taken By, (c) = Cosigned By    Initials Name Provider Type    Donald Monae PTA Physical Therapy Assistant            OP Exercises     Row Name 19 0740             Subjective Comments    Subjective Comments  Back pain less 3/10  -WS         Subjective Pain    Subjective Pain Comment  right knee pain 7/10  -WS         Total Minutes    74098 - PT Therapeutic Exercise Minutes  40  -WS         Exercise 1    Exercise Name 1  90/90 decompression  -WS      Time 1  2 minutes  -WS         Exercise 2    Exercise Name 2  PPT  -WS      Cueing 2  Demo  -WS      Sets 2  2  -WS      Reps 2  10  -WS      Time 2  5 sec  -WS          Exercise 3    Exercise Name 3  LTR  -WS      Cueing 3  Demo  -WS      Sets 3  1  -WS      Reps 3  15  -WS         Exercise 4    Exercise Name 4  H/L adduction squeeze  -WS      Cueing 4  Demo  -WS      Sets 4  2  -WS      Reps 4  10  -WS      Time 4  5 sec  -WS      Additional Comments  ball  -WS         Exercise 5    Exercise Name 5  Seated HS stretch  -WS      Cueing 5  Demo  -WS      Sets 5  1  -WS      Reps 5  3  -WS      Time 5  20 sec  -WS         Exercise 6    Exercise Name 6  Nustep UE/LE L5  -WS      Reps 6  5 min  -WS         Exercise 7    Exercise Name 7  piriformis stretch  -WS      Sets 7  3  -WS      Reps 7  20  -WS         Exercise 8    Exercise Name 8  Supine hip abd with TA  -WS      Sets 8  2  -WS      Reps 8  10  -WS      Additional Comments  RTB  -WS         Exercise 10    Exercise Name 10   RTB scap ret  -WS      Reps 10  15  -WS        User Key  (r) = Recorded By, (t) = Taken By, (c) = Cosigned By    Initials Name Provider Type    Donald Monae PTA Physical Therapy Assistant                       PT OP Goals     Row Name 11/08/19 0800          PT Short Term Goals    STG Date to Achieve  11/02/19  -WS     STG 1  Patient will be independent with initial HEP.  -     STG 1 Progress  Ongoing  -     STG 2  Patient will report standing tolerance, in a shopping line, to 10 minutes or greater.  -     STG 2 Progress  Progressing  -        Long Term Goals    LTG Date to Achieve  12/02/19  -     LTG 1  Patient will be independent with progressive HEP for long term management of current condition.  -     LTG 1 Progress  New  -     LTG 2  Patient will score </= 56% disability on the modified PARVEEN to indicate improved perceived performance with ADLs.  -     LTG 2 Progress  New  -     LTG 3  Patient will demonstrate safe lifting/transfer mechanics for long term spine preservation.  -     LTG 3 Progress  New  -     LTG 4  Standing/walking tolerance to 30 minutes or greater for  community integration.  -WS     LTG 4 Progress  New  -WS     LTG 5  Report improved ease with donning/doffing footwear by 75% or greater.  -WS       User Key  (r) = Recorded By, (t) = Taken By, (c) = Cosigned By    Initials Name Provider Type    Donald Monae PTA Physical Therapy Assistant          Therapy Education  Given: HEP, Symptoms/condition management, Posture/body mechanics  Program: Reinforced  How Provided: Verbal  Provided to: Patient              Time Calculation:   Start Time: 0740  Stop Time: 0820  Time Calculation (min): 40 min  Therapy Charges for Today     Code Description Service Date Service Provider Modifiers Qty    98856034537 HC PT THER PROC EA 15 MIN 11/8/2019 Donald Powell PTA GP 3                    Donald Powell PTA  11/8/2019

## 2019-11-13 ENCOUNTER — HOSPITAL ENCOUNTER (OUTPATIENT)
Dept: PHYSICAL THERAPY | Facility: HOSPITAL | Age: 74
Setting detail: THERAPIES SERIES
Discharge: HOME | End: 2019-11-13

## 2019-11-13 DIAGNOSIS — M54.50 CHRONIC BILATERAL LOW BACK PAIN WITHOUT SCIATICA: ICD-10-CM

## 2019-11-13 DIAGNOSIS — G89.29 CHRONIC BILATERAL LOW BACK PAIN WITHOUT SCIATICA: ICD-10-CM

## 2019-11-13 DIAGNOSIS — M54.50 ACUTE BILATERAL LOW BACK PAIN WITHOUT SCIATICA: Primary | ICD-10-CM

## 2019-11-13 PROCEDURE — 97110 THERAPEUTIC EXERCISES: CPT

## 2019-11-13 NOTE — THERAPY PROGRESS REPORT/RE-CERT
Outpatient Physical Therapy Ortho Progress Note  [facility]     Patient Name: Prudence Zollinger  : 1945  MRN: [id number]  Today's Date: 2019      Visit Date: 2019    Visit Dx:    ICD-10-CM ICD-9-CM   1. Acute bilateral low back pain without sciatica M54.5 724.2     338.19   2. Chronic bilateral low back pain without sciatica M54.5 724.2    G89.29 338.29       Patient Active Problem List   Diagnosis   • Major depressive disorder with single episode, in full remission (CMS/[facility])   • Acquired hypothyroidism   • RLS (restless legs syndrome)   • Familial hypercholesterolemia   • Essential hypertension   • Nausea   • Degenerative tear of medial meniscus, right   • Arthritis of right knee   • Meniscus, lateral, derangement, right   • Acute bilateral low back pain without sciatica   • Fall   • Bruising        Past Medical History:   Diagnosis Date   • Hyperlipidemia    • Hypertension    • Sleep apnea         Past Surgical History:   Procedure Laterality Date   • CHOLECYSTECTOMY     • HYSTERECTOMY     • KNEE CARTILAGE SURGERY Bilateral    • WRIST SURGERY Left                        PT Assessment/Plan     Row Name 19 0847          PT Assessment    Assessment Comments  Pt has attended 4/4 visits. She is demosntrating improve flexibility and core strength. She continues to have pain with prolonged standing and walking tasks. She will continue to benefit from skilled PT as we transition her to more functional exercises.  -        PT Plan    PT Plan Comments  continue core stabilization as you work into more functional positions - seated on ball, standing palloff press  -       User Key  (r) = Recorded By, (t) = Taken By, (c) = Cosigned By    Initials Name Provider Type    Ankita Turner, PT Physical Therapist            OP Exercises     Row Name 19 0800             Subjective Comments    Subjective Comments  Pt states he is having less back pain overall.  -         Subjective Pain     Able to rate subjective pain?  yes  -LC      Pre-Treatment Pain Level  4  -LC      Subjective Pain Comment  shoulders  -LC         Total Minutes    37663 - PT Therapeutic Exercise Minutes  38  -LC         Exercise 1    Exercise Name 1  90/90 decompression  -LC      Time 1  2 minutes  -LC         Exercise 2    Exercise Name 2  PPT  -LC      Cueing 2  Demo  -LC      Sets 2  2  -LC      Reps 2  10  -LC      Time 2  5 sec  -LC         Exercise 3    Exercise Name 3  LTR  -LC      Cueing 3  Demo  -LC      Sets 3  1  -LC      Reps 3  15  -LC         Exercise 4    Exercise Name 4  H/L adduction squeeze  -LC      Cueing 4  Demo  -LC      Sets 4  2  -LC      Reps 4  10  -LC      Time 4  5 sec  -LC      Additional Comments  ball  -LC         Exercise 5    Exercise Name 5  Seated HS stretch  -LC      Cueing 5  Demo  -LC      Sets 5  1  -LC      Reps 5  3  -LC      Time 5  20 sec  -LC         Exercise 6    Exercise Name 6  Nustep UE/LE L5  -LC      Reps 6  5 min  -LC         Exercise 7    Exercise Name 7  piriformis stretch  -LC      Sets 7  3  -LC      Reps 7  20  -LC         Exercise 8    Exercise Name 8  Supine hip abd with TA  -LC      Sets 8  2  -LC      Reps 8  10  -LC         Exercise 9    Exercise Name 9  bridge  -LC      Reps 9  10  -LC      Time 9  3  -LC         Exercise 10    Exercise Name 10   RTB scap ret  -LC      Reps 10  15  -LC         Exercise 11    Exercise Name 11  iso opp knee/hand in H/L  -LC      Reps 11  10  -LC      Time 11  3  -LC      Additional Comments  large green ball  -LC        User Key  (r) = Recorded By, (t) = Taken By, (c) = Cosigned By    Initials Name Provider Type    Ankita Turner, PT Physical Therapist                       PT OP Goals     Row Name 11/13/19 0800          PT Short Term Goals    STG Date to Achieve  11/02/19  -LC     STG 1  Patient will be independent with initial HEP.  -LC     STG 1 Progress  Met  -LC     STG 2  Patient will report standing tolerance, in a shopping line,  to 10 minutes or greater.  -LC     STG 2 Progress  Progressing  -LC        Long Term Goals    LTG Date to Achieve  12/02/19  -LC     LTG 1  Patient will be independent with progressive HEP for long term management of current condition.  -LC     LTG 1 Progress  Progressing  -LC     LTG 2  Patient will score </= 56% disability on the modified PARVEEN to indicate improved perceived performance with ADLs.  -LC     LTG 2 Progress  Ongoing  -LC     LTG 3  Patient will demonstrate safe lifting/transfer mechanics for long term spine preservation.  -LC     LTG 3 Progress  Ongoing  -LC     LTG 4  Standing/walking tolerance to 30 minutes or greater for community integration.  -LC     LTG 4 Progress  Progressing  -LC     LTG 5  Report improved ease with donning/doffing footwear by 75% or greater.  -LC     LTG 5 Progress  Progressing  -LC       User Key  (r) = Recorded By, (t) = Taken By, (c) = Cosigned By    Initials Name Provider Type    Ankita Turner, PT Physical Therapist          Therapy Education  Education Details: reviewed HEP  Given: HEP  Program: Reinforced  How Provided: Verbal  Provided to: Patient  Level of Understanding: Teach back education performed, Verbalized, Demonstrated              Time Calculation:   Start Time: 0800  Stop Time: 0838  Time Calculation (min): 38 min  Therapy Charges for Today     Code Description Service Date Service Provider Modifiers Qty    94159526690 HC PT THER PROC EA 15 MIN 11/13/2019 Ankita Metz, PT GP 3                    Ankita Metz, PT  11/13/2019

## 2019-11-15 ENCOUNTER — HOSPITAL ENCOUNTER (OUTPATIENT)
Dept: PHYSICAL THERAPY | Facility: HOSPITAL | Age: 74
Setting detail: THERAPIES SERIES
Discharge: HOME | End: 2019-11-15

## 2019-11-15 DIAGNOSIS — M54.50 CHRONIC BILATERAL LOW BACK PAIN WITHOUT SCIATICA: ICD-10-CM

## 2019-11-15 DIAGNOSIS — G89.29 CHRONIC BILATERAL LOW BACK PAIN WITHOUT SCIATICA: ICD-10-CM

## 2019-11-15 DIAGNOSIS — M54.50 ACUTE BILATERAL LOW BACK PAIN WITHOUT SCIATICA: Primary | ICD-10-CM

## 2019-11-15 PROCEDURE — 97110 THERAPEUTIC EXERCISES: CPT

## 2019-11-15 NOTE — THERAPY TREATMENT NOTE
Outpatient Physical Therapy Ortho Treatment Note  [facility]     Patient Name: Prudence Zollinger  : 1945  MRN: [id number]  Today's Date: 11/15/2019      Visit Date: 11/15/2019    Visit Dx:    ICD-10-CM ICD-9-CM   1. Acute bilateral low back pain without sciatica M54.5 724.2     338.19   2. Chronic bilateral low back pain without sciatica M54.5 724.2    G89.29 338.29       Patient Active Problem List   Diagnosis   • Major depressive disorder with single episode, in full remission (CMS/[facility])   • Acquired hypothyroidism   • RLS (restless legs syndrome)   • Familial hypercholesterolemia   • Essential hypertension   • Nausea   • Degenerative tear of medial meniscus, right   • Arthritis of right knee   • Meniscus, lateral, derangement, right   • Acute bilateral low back pain without sciatica   • Fall   • Bruising        Past Medical History:   Diagnosis Date   • Hyperlipidemia    • Hypertension    • Sleep apnea         Past Surgical History:   Procedure Laterality Date   • CHOLECYSTECTOMY     • HYSTERECTOMY     • KNEE CARTILAGE SURGERY Bilateral    • WRIST SURGERY Left                        PT Assessment/Plan     Row Name 11/15/19 0809          PT Assessment    Assessment Comments  Patient reporting increased mobility. Patient continue to have pain with prolonged activity. Continue to progress strengthening. Increased several exercises to GTB  -WS       User Key  (r) = Recorded By, (t) = Taken By, (c) = Cosigned By    Initials Name Provider Type    Donald Monae PTA Physical Therapy Assistant            OP Exercises     Row Name 11/15/19 1137             Subjective Comments    Subjective Comments  able to get down on the floor  -WS         Subjective Pain    Subjective Pain Comment  stretches really helping  -WS         Total Minutes    46750 - PT Therapeutic Exercise Minutes  40  -WS         Exercise 1    Exercise Name 1   decompression  -WS      Time 1  2 minutes  -WS         Exercise 2     Exercise Name 2  PPT  -WS      Cueing 2  Demo  -WS      Sets 2  2  -WS      Reps 2  10  -WS      Time 2  5 sec  -WS         Exercise 3    Exercise Name 3  LTR  -WS      Cueing 3  Demo  -WS      Sets 3  1  -WS      Reps 3  15  -WS         Exercise 4    Exercise Name 4  H/L adduction squeeze  -WS      Cueing 4  Demo  -WS      Sets 4  2  -WS      Reps 4  10  -WS      Time 4  5 sec  -WS      Additional Comments  ball  -WS         Exercise 5    Exercise Name 5  Seated HS stretch  -WS      Cueing 5  Demo  -WS      Sets 5  1  -WS      Reps 5  3  -WS      Time 5  20 sec  -WS         Exercise 6    Exercise Name 6  Nustep UE/LE L5  -WS      Reps 6  5 min  -WS         Exercise 7    Exercise Name 7  piriformis stretch  -WS      Sets 7  3  -WS      Reps 7  20  -WS      Additional Comments  towel  -WS         Exercise 8    Exercise Name 8  Supine hip abd with TA  -WS      Sets 8  2  -WS      Reps 8  10  -WS      Additional Comments  GTB  -WS         Exercise 9    Exercise Name 9  bridge  -WS      Reps 9  15  -WS      Time 9  3  -WS         Exercise 10    Exercise Name 10   GTB scap ret  -WS      Reps 10  15  -WS         Exercise 11    Exercise Name 11  iso opp knee/hand in H/L  -WS      Reps 11  10  -WS      Time 11  3  -WS      Additional Comments  green swiss ball  -WS         Exercise 12    Exercise Name 12  sidestepping  -WS      Time 12  x 3  -WS        User Key  (r) = Recorded By, (t) = Taken By, (c) = Cosigned By    Initials Name Provider Type    Donald Monae PTA Physical Therapy Assistant                       PT OP Goals     Row Name 11/15/19 0800          PT Short Term Goals    STG Date to Achieve  11/02/19  -WS     STG 1  Patient will be independent with initial HEP.  -WS     STG 1 Progress  Met  -WS     STG 2  Patient will report standing tolerance, in a shopping line, to 10 minutes or greater.  -WS     STG 2 Progress  Progressing  -WS        Long Term Goals    LTG Date to Achieve  12/02/19  -WS     LTG 1   Patient will be independent with progressive HEP for long term management of current condition.  -WS     LTG 1 Progress  Progressing  -WS     LTG 2  Patient will score </= 56% disability on the modified PARVEEN to indicate improved perceived performance with ADLs.  -WS     LTG 2 Progress  Ongoing  -WS     LTG 3  Patient will demonstrate safe lifting/transfer mechanics for long term spine preservation.  -WS     LTG 3 Progress  Ongoing  -WS     LTG 4  Standing/walking tolerance to 30 minutes or greater for community integration.  -WS     LTG 4 Progress  Progressing  -WS     LTG 5  Report improved ease with donning/doffing footwear by 75% or greater.  -WS     LTG 5 Progress  Progressing  -       User Key  (r) = Recorded By, (t) = Taken By, (c) = Cosigned By    Initials Name Provider Type    Donald Monae PTA Physical Therapy Assistant          Therapy Education  Given: HEP, Symptoms/condition management, Pain management, Posture/body mechanics  Program: Reinforced, New  How Provided: Verbal  Provided to: Patient              Time Calculation:   Start Time: 0730  Stop Time: 0810  Time Calculation (min): 40 min  Therapy Charges for Today     Code Description Service Date Service Provider Modifiers Qty    18041263640 HC PT THER PROC EA 15 MIN 11/15/2019 Donald Powell PTA GP 3                    Donald Powell PTA  11/15/2019

## 2019-11-20 ENCOUNTER — HOSPITAL ENCOUNTER (OUTPATIENT)
Dept: PHYSICAL THERAPY | Facility: HOSPITAL | Age: 74
Setting detail: THERAPIES SERIES
Discharge: HOME | End: 2019-11-20

## 2019-11-20 DIAGNOSIS — M54.50 CHRONIC BILATERAL LOW BACK PAIN WITHOUT SCIATICA: ICD-10-CM

## 2019-11-20 DIAGNOSIS — G89.29 CHRONIC BILATERAL LOW BACK PAIN WITHOUT SCIATICA: ICD-10-CM

## 2019-11-20 DIAGNOSIS — M54.50 ACUTE BILATERAL LOW BACK PAIN WITHOUT SCIATICA: Primary | ICD-10-CM

## 2019-11-20 PROCEDURE — 97110 THERAPEUTIC EXERCISES: CPT

## 2019-11-20 NOTE — THERAPY TREATMENT NOTE
Outpatient Physical Therapy Ortho Treatment Note  [facility]     Patient Name: Prudence Zollinger  : 1945  MRN: [id number]  Today's Date: 2019      Visit Date: 2019    Visit Dx:    ICD-10-CM ICD-9-CM   1. Acute bilateral low back pain without sciatica M54.5 724.2     338.19   2. Chronic bilateral low back pain without sciatica M54.5 724.2    G89.29 338.29       Patient Active Problem List   Diagnosis   • Major depressive disorder with single episode, in full remission (CMS/[facility])   • Acquired hypothyroidism   • RLS (restless legs syndrome)   • Familial hypercholesterolemia   • Essential hypertension   • Nausea   • Degenerative tear of medial meniscus, right   • Arthritis of right knee   • Meniscus, lateral, derangement, right   • Acute bilateral low back pain without sciatica   • Fall   • Bruising        Past Medical History:   Diagnosis Date   • Hyperlipidemia    • Hypertension    • Sleep apnea         Past Surgical History:   Procedure Laterality Date   • CHOLECYSTECTOMY     • HYSTERECTOMY     • KNEE CARTILAGE SURGERY Bilateral    • WRIST SURGERY Left                        PT Assessment/Plan     Row Name 19 0806          PT Assessment    Assessment Comments  Improved tolerance to activtity and mobility. Patient will work on home management. Patient discharged to [facility].   -WS       User Key  (r) = Recorded By, (t) = Taken By, (c) = Cosigned By    Initials Name Provider Type    Donald Monae PTA Physical Therapy Assistant            OP Exercises     Row Name 19 0730             Subjective Comments    Subjective Comments  no pain  -WS         Total Minutes    75579 - PT Therapeutic Exercise Minutes  40  -WS         Exercise 1    Exercise Name 1  90/90 decompression  -WS         Exercise 2    Exercise Name 2  PPT  -WS      Cueing 2  Demo  -WS      Sets 2  2  -WS      Reps 2  10  -WS      Time 2  5 sec  -WS         Exercise 3    Exercise Name 3  LTR  -WS      Cueing 3  Demo   -WS      Sets 3  1  -WS      Reps 3  15  -WS         Exercise 4    Exercise Name 4  H/L adduction squeeze  -WS      Cueing 4  Demo  -WS      Sets 4  2  -WS      Reps 4  10  -WS      Time 4  5 sec  -WS      Additional Comments  ball  -WS         Exercise 5    Exercise Name 5  Seated HS stretch  -WS      Cueing 5  Demo  -WS      Sets 5  1  -WS      Reps 5  3  -WS      Time 5  20 sec  -WS         Exercise 6    Exercise Name 6  Nustep UE/LE L5  -WS      Reps 6  5 min  -WS         Exercise 7    Exercise Name 7  piriformis stretch  -WS      Sets 7  3  -WS      Reps 7  20  -WS      Additional Comments  towel  -WS         Exercise 8    Exercise Name 8  Supine hip abd with TA  -WS      Sets 8  2  -WS      Reps 8  10  -WS      Additional Comments  GTB  -WS         Exercise 9    Exercise Name 9  bridge  -WS      Reps 9  15  -WS      Time 9  3  -WS         Exercise 10    Exercise Name 10   GTB scap ret  -WS      Reps 10  15  -WS         Exercise 11    Exercise Name 11  iso opp knee/hand in H/L  -WS      Reps 11  10  -WS      Time 11  3  -WS      Additional Comments  green swiss ball  -WS         Exercise 12    Exercise Name 12  sidestepping  -WS      Time 12  x 3  -WS      Additional Comments  RTB  -WS         Exercise 13    Exercise Name 13  add HS curl  -WS        User Key  (r) = Recorded By, (t) = Taken By, (c) = Cosigned By    Initials Name Provider Type    Donald Monae PTA Physical Therapy Assistant                       PT OP Goals     Row Name 11/20/19 0800          PT Short Term Goals    STG Date to Achieve  11/02/19  -WS     STG 1  Patient will be independent with initial HEP.  -WS     STG 1 Progress  Met  -WS     STG 2  Patient will report standing tolerance, in a shopping line, to 10 minutes or greater.  -WS     STG 2 Progress  Met  -WS        Long Term Goals    LTG Date to Achieve  12/02/19  -WS     LTG 1  Patient will be independent with progressive HEP for long term management of current condition.  -WS      LTG 1 Progress  Met  -WS     LTG 2  Patient will score </= 56% disability on the modified PARVEEN to indicate improved perceived performance with ADLs.  -     LTG 3  Patient will demonstrate safe lifting/transfer mechanics for long term spine preservation.  -WS     LTG 3 Progress  Met  -     LTG 4  Standing/walking tolerance to 30 minutes or greater for community integration.  -     LTG 4 Progress  Met  -     LTG 5  Report improved ease with donning/doffing footwear by 75% or greater.  -     LTG 5 Progress  Progressing  -       User Key  (r) = Recorded By, (t) = Taken By, (c) = Cosigned By    Initials Name Provider Type    Donald Monae PTA Physical Therapy Assistant          Therapy Education  Given: HEP  Program: Reinforced  How Provided: Verbal  Provided to: Patient              Time Calculation:   Start Time: 0730  Stop Time: 0810  Time Calculation (min): 40 min  Therapy Charges for Today     Code Description Service Date Service Provider Modifiers Qty    02187455924 HC PT THER PROC EA 15 MIN 11/20/2019 Donald Powell PTA GP 3                    Donald Powell PTA  11/20/2019

## 2019-11-22 ENCOUNTER — APPOINTMENT (OUTPATIENT)
Dept: PHYSICAL THERAPY | Facility: HOSPITAL | Age: 74
End: 2019-11-22

## 2019-11-27 RX ORDER — ROSUVASTATIN CALCIUM 5 MG/1
TABLET, COATED ORAL
Qty: 90 TABLET | Refills: 2 | Status: SHIPPED | OUTPATIENT
Start: 2019-11-27 | End: 2020-04-29 | Stop reason: SDUPTHER

## 2019-12-10 DIAGNOSIS — M54.50 ACUTE BILATERAL LOW BACK PAIN WITHOUT SCIATICA: ICD-10-CM

## 2019-12-11 RX ORDER — GABAPENTIN 800 MG/1
TABLET ORAL
Qty: 90 TABLET | Refills: 0 | Status: SHIPPED | OUTPATIENT
Start: 2019-12-11 | End: 2020-01-09 | Stop reason: SDUPTHER

## 2019-12-11 RX ORDER — TRAMADOL HYDROCHLORIDE 50 MG/1
TABLET ORAL
Qty: 30 TABLET | Refills: 1 | Status: SHIPPED | OUTPATIENT
Start: 2019-12-11 | End: 2020-02-26

## 2019-12-13 ENCOUNTER — TELEPHONE (OUTPATIENT)
Dept: INTERNAL MEDICINE | Facility: CLINIC | Age: 74
End: 2019-12-13

## 2019-12-13 DIAGNOSIS — M54.50 ACUTE BILATERAL LOW BACK PAIN WITHOUT SCIATICA: ICD-10-CM

## 2019-12-13 RX ORDER — GABAPENTIN 800 MG/1
800 TABLET ORAL DAILY
Qty: 90 TABLET | Refills: 0 | Status: CANCELLED | OUTPATIENT
Start: 2019-12-13

## 2019-12-13 RX ORDER — TRAMADOL HYDROCHLORIDE 50 MG/1
50 TABLET ORAL DAILY
Qty: 30 TABLET | Refills: 1 | Status: CANCELLED | OUTPATIENT
Start: 2019-12-13

## 2019-12-13 NOTE — TELEPHONE ENCOUNTER
Pt does not need early refill on both Rx, we sent both 12/11 but she didn't know that. Pt will check with [facility].

## 2019-12-16 RX ORDER — TRAMADOL HYDROCHLORIDE 50 MG/1
50 TABLET ORAL DAILY
Qty: 30 TABLET | Refills: 1 | Status: CANCELLED | OUTPATIENT
Start: 2019-12-16

## 2019-12-16 NOTE — TELEPHONE ENCOUNTER
I spoke to the pharmacist at [facility] and she stated the earliest the Gabapentin can be filled is 12-19-19 and Tramadol 12/31/19.    She stated if you approve of this they can fill this early for her.     Please further advise.    Thank you,    Bacilio

## 2019-12-16 NOTE — TELEPHONE ENCOUNTER
PT STATES THAT SHE IS GOING OUT OF TOWN [facility] AND SHE IS NEEDING HER traMADol (ULTRAM) 50 MG tablet AND gabapentin (NEURONTIN) 800 MG tablet. SHE STATES THAT THE REFILLS ARE AT THE PHARMACY AND HAVE ALREADY BEEN FILLED, BUT THEY ARE NEEDING A NOTE FROM THE DOCTOR STATING THAT IT IS OKAY FOR HER TO GET THEM FILLED EARLY.     SHE STATES THAT HER ULTRAM RUNS OUT ON THE 31ST BUT SHE WONT BE COMING BACK UNTIL THAT DAY OR LATER AND SHE WOULD LIKE TO HAVE THESE MEDICATIONS TO TAKE WITH HER. SHE STATES THAT SHE IS HOPING TO GET THESE BEFORE SHE LEAVES [facility] AT 10PM.     PLEASE ADVISE.

## 2019-12-27 RX ORDER — PRAMIPEXOLE DIHYDROCHLORIDE 0.5 MG/1
TABLET ORAL
Qty: 90 TABLET | Refills: 2 | Status: SHIPPED | OUTPATIENT
Start: 2019-12-27 | End: 2020-04-29 | Stop reason: SDUPTHER

## 2020-01-09 ENCOUNTER — OFFICE VISIT (OUTPATIENT)
Dept: INTERNAL MEDICINE | Facility: CLINIC | Age: 75
End: 2020-01-09

## 2020-01-09 VITALS
WEIGHT: 213 LBS | HEART RATE: 60 BPM | BODY MASS INDEX: 39.2 KG/M2 | SYSTOLIC BLOOD PRESSURE: 120 MMHG | OXYGEN SATURATION: 97 % | DIASTOLIC BLOOD PRESSURE: 70 MMHG | HEIGHT: 62 IN

## 2020-01-09 DIAGNOSIS — M54.50 CHRONIC BILATERAL LOW BACK PAIN WITHOUT SCIATICA: ICD-10-CM

## 2020-01-09 DIAGNOSIS — E03.9 ACQUIRED HYPOTHYROIDISM: ICD-10-CM

## 2020-01-09 DIAGNOSIS — G89.29 CHRONIC BILATERAL LOW BACK PAIN WITHOUT SCIATICA: ICD-10-CM

## 2020-01-09 DIAGNOSIS — I10 ESSENTIAL HYPERTENSION: ICD-10-CM

## 2020-01-09 DIAGNOSIS — E78.01 FAMILIAL HYPERCHOLESTEROLEMIA: Primary | ICD-10-CM

## 2020-01-09 DIAGNOSIS — G25.81 RLS (RESTLESS LEGS SYNDROME): ICD-10-CM

## 2020-01-09 LAB
ALBUMIN SERPL-MCNC: 4.1 G/DL (ref 3.5–5.2)
ALBUMIN/GLOB SERPL: 1.2 G/DL
ALP SERPL-CCNC: 112 U/L (ref 39–117)
ALT SERPL-CCNC: 16 U/L (ref 1–33)
AST SERPL-CCNC: 15 U/L (ref 1–32)
BILIRUB SERPL-MCNC: 0.3 MG/DL (ref 0.2–1.2)
BUN SERPL-MCNC: 19 MG/DL (ref 8–23)
BUN/CREAT SERPL: 21.8 (ref 7–25)
CALCIUM SERPL-MCNC: 9.2 MG/DL (ref 8.6–10.5)
CHLORIDE SERPL-SCNC: 102 MMOL/L (ref 98–107)
CHOLEST SERPL-MCNC: 114 MG/DL (ref 0–200)
CO2 SERPL-SCNC: 25.2 MMOL/L (ref 22–29)
CREAT SERPL-MCNC: 0.87 MG/DL (ref 0.57–1)
GLOBULIN SER CALC-MCNC: 3.5 GM/DL
GLUCOSE SERPL-MCNC: 92 MG/DL (ref 65–99)
HDLC SERPL-MCNC: 47 MG/DL (ref 40–60)
LDLC SERPL CALC-MCNC: 45 MG/DL (ref 0–100)
POTASSIUM SERPL-SCNC: 4.1 MMOL/L (ref 3.5–5.2)
PROT SERPL-MCNC: 7.6 G/DL (ref 6–8.5)
SODIUM SERPL-SCNC: 140 MMOL/L (ref 136–145)
TRIGL SERPL-MCNC: 111 MG/DL (ref 0–150)
TSH SERPL-ACNC: 0.28 UIU/ML (ref 0.27–4.2)
VLDLC SERPL-MCNC: 22.2 MG/DL

## 2020-01-09 PROCEDURE — 99214 OFFICE O/P EST MOD 30 MIN: CPT | Performed by: INTERNAL MEDICINE

## 2020-01-09 PROCEDURE — 36415 COLL VENOUS BLD VENIPUNCTURE: CPT | Performed by: INTERNAL MEDICINE

## 2020-01-09 RX ORDER — AMLODIPINE BESYLATE 10 MG/1
10 TABLET ORAL DAILY
COMMUNITY
Start: 2019-12-28 | End: 2020-04-29 | Stop reason: SDUPTHER

## 2020-01-09 RX ORDER — ONDANSETRON 4 MG/1
4 TABLET, FILM COATED ORAL EVERY 8 HOURS PRN
Qty: 60 TABLET | Refills: 3 | Status: SHIPPED | OUTPATIENT
Start: 2020-01-09 | End: 2020-04-29 | Stop reason: SDUPTHER

## 2020-01-09 NOTE — PROGRESS NOTES
"Lorenzo Christiansone Zollinger is a 74 y.o. female.  The patient presents with a chief complaint of hypertension, hypothyroidism, hyperlipidemia, chronic low back pain that is better at this time, well-controlled restless leg syndrome here for follow-up evaluation and lab check.  Since her last appointment she had had her blood pressure medicines adjusted by her cardiologist and is doing better overall.      /70   Pulse 60   Ht 157.5 cm (62.01\")   Wt 96.6 kg (213 lb)   SpO2 97%   BMI 38.95 kg/m²     Body mass index is 38.95 kg/m².    History of Present Illness recent adjustment of blood pressure medicines with excellent outcome    The following portions of the patient's history were reviewed and updated as appropriate: allergies, current medications, past family history, past medical history, past social history, past surgical history and problem list.    Review of Systems   Constitutional: Negative.    HENT: Negative.    Eyes: Negative.    Respiratory: Negative.    Cardiovascular: Negative.    Gastrointestinal: Negative.    Endocrine: Negative.    Genitourinary: Negative.    Musculoskeletal: Positive for arthralgias and back pain.   Skin: Negative.    Allergic/Immunologic: Negative.    Neurological: Negative.    Hematological: Negative.    Psychiatric/Behavioral: Negative.        Objective   Physical Exam   Constitutional: She is oriented to person, place, and time. She appears well-developed and well-nourished.   HENT:   Head: Normocephalic and atraumatic.   Eyes: Pupils are equal, round, and reactive to light. EOM are normal.   Neck: Normal range of motion. Neck supple.   Cardiovascular: Normal rate, regular rhythm and normal heart sounds.   Pulmonary/Chest: Effort normal and breath sounds normal.   Abdominal: Soft. Bowel sounds are normal.   Musculoskeletal:   Mild chronic low back pain   Neurological: She is alert and oriented to person, place, and time.   Skin: Skin is warm and dry.   Psychiatric: " She has a normal mood and affect. Her behavior is normal. Judgment and thought content normal.   Nursing note and vitals reviewed.        Assessment/Plan   Prudence was seen today for hypertension and hyperlipidemia.    Diagnoses and all orders for this visit:    Familial hypercholesterolemia  Comments:  check a lipid panel and a CMP  Orders:  -     Comprehensive metabolic panel; Future  -     Lipid panel; Future    Essential hypertension  Comments:  currently well controlled    Acquired hypothyroidism  Comments:  check a TSH  Orders:  -     TSH; Future    RLS (restless legs syndrome)  Comments:  continue mirapex    Chronic bilateral low back pain without sciatica  Comments:  doing better    Other orders  -     ondansetron (ZOFRAN) 4 MG tablet; Take 1 tablet by mouth Every 8 (Eight) Hours As Needed for Nausea or Vomiting.

## 2020-01-13 ENCOUNTER — DOCUMENTATION (OUTPATIENT)
Dept: PHYSICAL THERAPY | Facility: HOSPITAL | Age: 75
End: 2020-01-13

## 2020-01-13 NOTE — THERAPY DISCHARGE NOTE
Outpatient Physical Therapy Ortho Treatment Note/Discharge Summary       Patient Name: Prudence Zollinger  : 1945  MRN: [id number]  Today's Date: 2020      Visit Date: 2020    Visit Dx:  No diagnosis found.    Patient Active Problem List   Diagnosis   • Major depressive disorder with single episode, in full remission (CMS/HCC)   • Acquired hypothyroidism   • RLS (restless legs syndrome)   • Familial hypercholesterolemia   • Essential hypertension   • Nausea   • Degenerative tear of medial meniscus, right   • Arthritis of right knee   • Meniscus, lateral, derangement, right   • Acute bilateral low back pain without sciatica   • Fall   • Bruising        Past Medical History:   Diagnosis Date   • Hyperlipidemia    • Hypertension    • Sleep apnea         Past Surgical History:   Procedure Laterality Date   • CHOLECYSTECTOMY     • HYSTERECTOMY     • KNEE CARTILAGE SURGERY Bilateral    • WRIST SURGERY Left                                                   PT OP Goals     Row Name 20 1100          PT Short Term Goals    STG Date to Achieve  19  -     STG 1  Patient will be independent with initial HEP.  -     STG 1 Progress  Met  -     STG 2  Patient will report standing tolerance, in a shopping line, to 10 minutes or greater.  -     STG 2 Progress  Met  -        Long Term Goals    LTG Date to Achieve  19  -WS     LTG 1  Patient will be independent with progressive HEP for long term management of current condition.  -     LTG 1 Progress  Met  -     LTG 2  Patient will score </= 56% disability on the modified PARVEEN to indicate improved perceived performance with ADLs.  -     LTG 3  Patient will demonstrate safe lifting/transfer mechanics for long term spine preservation.  -     LTG 3 Progress  Met  -     LTG 4  Standing/walking tolerance to 30 minutes or greater for community integration.  -     LTG 4 Progress  Met  -     LTG 5  Report improved ease with  donning/doffing footwear by 75% or greater.  -WS     LTG 5 Progress  Met  -WS       User Key  (r) = Recorded By, (t) = Taken By, (c) = Cosigned By    Initials Name Provider Type    Donald Monae PTA Physical Therapy Assistant                         Time Calculation:                OP PT Discharge Summary  Date of Discharge: 01/13/20  Outcomes Achieved: Patient able to partially acheive established goals  Discharge Destination: Home with home program  Discharge Instructions/Additional Comments: Patient seen for 6 visits from 10/18/19 to 11/20/19 consisting of therapeutic exercise and posture/ education. All goals met with patient independent with home management. Patient discharged.       Donald Powell PTA  1/13/2020

## 2020-02-25 DIAGNOSIS — M54.50 ACUTE BILATERAL LOW BACK PAIN WITHOUT SCIATICA: ICD-10-CM

## 2020-02-26 RX ORDER — TRAMADOL HYDROCHLORIDE 50 MG/1
TABLET ORAL
Qty: 30 TABLET | Refills: 0 | Status: SHIPPED | OUTPATIENT
Start: 2020-02-26 | End: 2020-03-31

## 2020-03-24 DIAGNOSIS — M54.50 ACUTE BILATERAL LOW BACK PAIN WITHOUT SCIATICA: ICD-10-CM

## 2020-03-24 RX ORDER — GABAPENTIN 800 MG/1
TABLET ORAL
Qty: 90 TABLET | Refills: 0 | Status: SHIPPED | OUTPATIENT
Start: 2020-03-24 | End: 2020-04-29 | Stop reason: SDUPTHER

## 2020-03-30 DIAGNOSIS — M54.50 ACUTE BILATERAL LOW BACK PAIN WITHOUT SCIATICA: ICD-10-CM

## 2020-03-31 RX ORDER — TRAMADOL HYDROCHLORIDE 50 MG/1
TABLET ORAL
Qty: 30 TABLET | Refills: 0 | Status: SHIPPED | OUTPATIENT
Start: 2020-03-31 | End: 2020-04-29 | Stop reason: SDUPTHER

## 2020-04-01 ENCOUNTER — TELEPHONE (OUTPATIENT)
Dept: INTERNAL MEDICINE | Facility: CLINIC | Age: 75
End: 2020-04-01

## 2020-04-01 NOTE — TELEPHONE ENCOUNTER
Pt is moving to [place] in a few weeks and was wondering if by chance you know of any providers there you could recommend?

## 2020-04-06 NOTE — TELEPHONE ENCOUNTER
Message left on v/m informing patient provider is unaware of any providers in the [place] area.     Patient can contact 800 number on insurance card to see of any providers accepting new patients in that area.

## 2020-04-28 ENCOUNTER — TELEPHONE (OUTPATIENT)
Dept: INTERNAL MEDICINE | Facility: CLINIC | Age: 75
End: 2020-04-28

## 2020-04-28 DIAGNOSIS — M54.50 ACUTE BILATERAL LOW BACK PAIN WITHOUT SCIATICA: ICD-10-CM

## 2020-04-29 RX ORDER — ROSUVASTATIN CALCIUM 5 MG/1
5 TABLET, COATED ORAL DAILY
Qty: 90 TABLET | Refills: 2 | Status: SHIPPED | OUTPATIENT
Start: 2020-04-29

## 2020-04-29 RX ORDER — OMEPRAZOLE 40 MG/1
40 CAPSULE, DELAYED RELEASE ORAL DAILY
Qty: 90 CAPSULE | Refills: 3 | Status: SHIPPED | OUTPATIENT
Start: 2020-04-29

## 2020-04-29 RX ORDER — ESCITALOPRAM OXALATE 20 MG/1
20 TABLET ORAL DAILY
Qty: 90 TABLET | Refills: 3 | Status: SHIPPED | OUTPATIENT
Start: 2020-04-29 | End: 2020-06-15

## 2020-04-29 RX ORDER — FERROUS SULFATE 325(65) MG
325 TABLET ORAL DAILY
Qty: 90 TABLET | Refills: 3 | Status: SHIPPED | OUTPATIENT
Start: 2020-04-29

## 2020-04-29 RX ORDER — FUROSEMIDE 20 MG/1
20 TABLET ORAL DAILY
Qty: 90 TABLET | Refills: 3 | Status: SHIPPED | OUTPATIENT
Start: 2020-04-29

## 2020-04-29 RX ORDER — GABAPENTIN 800 MG/1
800 TABLET ORAL DAILY
Qty: 90 TABLET | Refills: 0 | Status: SHIPPED | OUTPATIENT
Start: 2020-04-29

## 2020-04-29 RX ORDER — TRAMADOL HYDROCHLORIDE 50 MG/1
50 TABLET ORAL DAILY
Qty: 90 TABLET | Refills: 0 | Status: SHIPPED | OUTPATIENT
Start: 2020-04-29 | End: 2020-07-27

## 2020-04-29 RX ORDER — ONDANSETRON 4 MG/1
4 TABLET, FILM COATED ORAL EVERY 8 HOURS PRN
Qty: 60 TABLET | Refills: 3 | Status: SHIPPED | OUTPATIENT
Start: 2020-04-29

## 2020-04-29 RX ORDER — PRAMIPEXOLE DIHYDROCHLORIDE 0.12 MG/1
0.12 TABLET ORAL DAILY
Qty: 90 TABLET | Refills: 3 | Status: SHIPPED | OUTPATIENT
Start: 2020-04-29 | End: 2020-06-24

## 2020-04-29 RX ORDER — AMLODIPINE BESYLATE 10 MG/1
10 TABLET ORAL DAILY
Qty: 90 TABLET | Refills: 3 | Status: SHIPPED | OUTPATIENT
Start: 2020-04-29

## 2020-04-29 RX ORDER — LEVOTHYROXINE SODIUM 0.15 MG/1
150 TABLET ORAL DAILY
Qty: 90 TABLET | Refills: 3 | Status: SHIPPED | OUTPATIENT
Start: 2020-04-29

## 2020-04-29 RX ORDER — TELMISARTAN 80 MG/1
80 TABLET ORAL DAILY
Qty: 90 TABLET | Refills: 2 | Status: SHIPPED | OUTPATIENT
Start: 2020-04-29

## 2020-04-29 RX ORDER — PRAMIPEXOLE DIHYDROCHLORIDE 0.5 MG/1
0.5 TABLET ORAL DAILY
Qty: 90 TABLET | Refills: 2 | Status: SHIPPED | OUTPATIENT
Start: 2020-04-29 | End: 2020-07-27

## 2020-04-29 NOTE — TELEPHONE ENCOUNTER
I sent all non-controlled prescriptions to pharmacy, but she also needs Gabapentin and Tramadol.   OV 1/9/20, next:7/16/20.  Zhang done 2/26/20.

## 2020-06-15 RX ORDER — ESCITALOPRAM OXALATE 20 MG/1
TABLET ORAL
Qty: 90 TABLET | Refills: 2 | Status: SHIPPED | OUTPATIENT
Start: 2020-06-15

## 2020-06-24 RX ORDER — PRAMIPEXOLE DIHYDROCHLORIDE 0.12 MG/1
TABLET ORAL
Qty: 90 TABLET | Refills: 1 | Status: SHIPPED | OUTPATIENT
Start: 2020-06-24 | End: 2020-07-27

## 2020-07-27 DIAGNOSIS — M54.50 ACUTE BILATERAL LOW BACK PAIN WITHOUT SCIATICA: ICD-10-CM

## 2020-07-27 RX ORDER — PRAMIPEXOLE DIHYDROCHLORIDE 0.12 MG/1
TABLET ORAL
Qty: 90 TABLET | Refills: 0 | Status: SHIPPED | OUTPATIENT
Start: 2020-07-27 | End: 2020-10-26

## 2020-07-27 RX ORDER — PRAMIPEXOLE DIHYDROCHLORIDE 0.5 MG/1
0.5 TABLET ORAL DAILY
Qty: 90 TABLET | Refills: 0 | Status: SHIPPED | OUTPATIENT
Start: 2020-07-27 | End: 2020-10-26

## 2020-07-27 RX ORDER — TRAMADOL HYDROCHLORIDE 50 MG/1
50 TABLET ORAL DAILY
Qty: 90 TABLET | Refills: 0 | Status: SHIPPED | OUTPATIENT
Start: 2020-07-27

## 2020-10-26 RX ORDER — PRAMIPEXOLE DIHYDROCHLORIDE 0.5 MG/1
0.5 TABLET ORAL DAILY
Qty: 90 TABLET | Refills: 0 | Status: SHIPPED | OUTPATIENT
Start: 2020-10-26

## 2020-10-26 RX ORDER — PRAMIPEXOLE DIHYDROCHLORIDE 0.12 MG/1
TABLET ORAL
Qty: 90 TABLET | Refills: 0 | Status: SHIPPED | OUTPATIENT
Start: 2020-10-26

## 2021-05-15 VITALS
WEIGHT: 213 LBS | HEIGHT: 61 IN | BODY MASS INDEX: 40.22 KG/M2 | HEART RATE: 70 BPM | DIASTOLIC BLOOD PRESSURE: 82 MMHG | SYSTOLIC BLOOD PRESSURE: 172 MMHG